# Patient Record
Sex: MALE | Race: WHITE | NOT HISPANIC OR LATINO | Employment: UNEMPLOYED | URBAN - NONMETROPOLITAN AREA
[De-identification: names, ages, dates, MRNs, and addresses within clinical notes are randomized per-mention and may not be internally consistent; named-entity substitution may affect disease eponyms.]

---

## 2020-12-15 NOTE — PROGRESS NOTES
Chief Complaint   Patient presents with   • Colon Cancer Screening     Pt presents today for colon recall-last colon was 10/12/2015; Personal history of hyperplastic polyps     Subjective   HPI  Issac Back is a 60 y.o. male who presents as a referral for preventative maintenance. He has no complaints of nausea or vomiting. No change in bowels. No wt loss. No BRBPR. No melena. There is no family hx for colon cancer. No abdominal pain. There was no Cologuard screening this year.  Patient's last colonoscopy revealed 1 6 mm hyperplastic polyp in the rectum performed on 10/12/2015.  Past Medical History:   Diagnosis Date   • History of colon polyps    • Hypertension      Past Surgical History:   Procedure Laterality Date   • COLONOSCOPY  10/12/2015    One 6mm hyperplastic polyp in the rectum; Repeat 5 years   • VASECTOMY         Current Outpatient Medications:   •  amLODIPine-benazepril (LOTREL 5-20) 5-20 MG per capsule, Take 1 capsule by mouth Daily., Disp: , Rfl:   •  sodium-potassium-magnesium sulfates (Suprep Bowel Prep Kit) 17.5-3.13-1.6 GM/177ML solution oral solution, Take 1 bottle by mouth Every 12 (Twelve) Hours. Split dose prep as directed by office instructions provided.  2 bottles = one kit., Disp: 2 bottle, Rfl: 0  No Known Allergies  Social History     Socioeconomic History   • Marital status:      Spouse name: Not on file   • Number of children: Not on file   • Years of education: Not on file   • Highest education level: Not on file   Tobacco Use   • Smoking status: Former Smoker   • Smokeless tobacco: Never Used   Substance and Sexual Activity   • Alcohol use: Not Currently   • Drug use: Never     Family History   Problem Relation Age of Onset   • Brain cancer Father    • Colon cancer Neg Hx    • Colon polyps Neg Hx    • Esophageal cancer Neg Hx    • Liver cancer Neg Hx    • Liver disease Neg Hx    • Rectal cancer Neg Hx    • Stomach cancer Neg Hx        REVIEW OF SYSTEMS  General: well  "appearing, no fever chills or sweats, no unexplained wt loss  HEENT: no acute visual or hearing disturbances  Cardiovascular: No chest pain or palpitations  Pulmonary: No shortness of breath, coughing, wheezing or hemoptysis  : No burning, urgency, hematuria, or dysuria  Musculoskeletal: No joint pain or stiffness  Peripheral: no edema  Skin: No lesions or rashes  Neuro: No dizziness, headaches, stroke, syncope  Endocrine: No hot or cold intolerances  Hematological: No blood dyscrasias    Objective   Vitals:    12/16/20 0825   BP: 134/82   BP Location: Left arm   Patient Position: Sitting   Cuff Size: Adult   Pulse: 84   Temp: 97.8 °F (36.6 °C)   TempSrc: Infrared   SpO2: 99%   Weight: 123 kg (271 lb)   Height: 182.9 cm (72\")     Body mass index is 36.75 kg/m².    PHYSICAL EXAM  General: age appropriate well nourished well appearing, no acute distress  Head: normocephalic and atraumatic  Global assessment-supple  Neck-No JVD noted, no lymphadenopathy  Pulmonary-clear to auscultation bilaterally, normal respiratory effort  Cardiovascular-normal rate and rhythm, normal heart sounds, S1 and S2 noted  Abdomen-soft, non tender, non distended, normal bowel sounds all 4 quadrants, no hepatosplenomegaly noted  Extremities-No clubbing cyanosis or edema  Neuro-Non focal, converses appropriately, awake, alert, oriented        Imaging Results (Most Recent)     None        Assessment/Plan   Diagnoses and all orders for this visit:    1. Hx of colonic polyps (Primary)  -     Case Request; Standing    Other orders  -     Follow Anesthesia Guidelines / Protocol; Future  -     Obtain Informed Consent; Future  -     Implement Anesthesia Orders Day of Procedure; Standing  -     Obtain Informed Consent; Standing  -     Verify bowel prep was successful; Standing  -     sodium-potassium-magnesium sulfates (Suprep Bowel Prep Kit) 17.5-3.13-1.6 GM/177ML solution oral solution; Take 1 bottle by mouth Every 12 (Twelve) Hours. Split dose " prep as directed by office instructions provided.  2 bottles = one kit.  Dispense: 2 bottle; Refill: 0      COLONOSCOPY WITH ANESTHESIA (N/A)       Body mass index is 36.75 kg/m². Patient's Body mass index is 36.75 kg/m². BMI is above normal parameters. Recommendations include: nutrition counseling.      All risks, benefits, alternatives, and indications of colonoscopy procedure have been discussed with the patient. Risks to include perforation of the colon requiring possible surgery or colostomy, risk of bleeding from biopsies or removal of colon tissue, possibility of missing a colon polyp or cancer, or adverse drug reaction.  Benefits to include the diagnosis and management of disease of the colon and rectum. Alternatives to include barium enema, radiographic evaluation, lab testing or no intervention. Pt verbalizes understanding and agrees.

## 2020-12-16 ENCOUNTER — OFFICE VISIT (OUTPATIENT)
Dept: GASTROENTEROLOGY | Facility: CLINIC | Age: 60
End: 2020-12-16

## 2020-12-16 VITALS
TEMPERATURE: 97.8 F | SYSTOLIC BLOOD PRESSURE: 134 MMHG | WEIGHT: 271 LBS | OXYGEN SATURATION: 99 % | DIASTOLIC BLOOD PRESSURE: 82 MMHG | BODY MASS INDEX: 36.7 KG/M2 | HEIGHT: 72 IN | HEART RATE: 84 BPM

## 2020-12-16 DIAGNOSIS — Z86.010 HX OF COLONIC POLYPS: Primary | ICD-10-CM

## 2020-12-16 PROBLEM — Z86.0100 HX OF COLONIC POLYPS: Status: ACTIVE | Noted: 2020-12-16

## 2020-12-16 PROCEDURE — S0285 CNSLT BEFORE SCREEN COLONOSC: HCPCS | Performed by: NURSE PRACTITIONER

## 2020-12-16 RX ORDER — AMLODIPINE BESYLATE AND BENAZEPRIL HYDROCHLORIDE 5; 20 MG/1; MG/1
1 CAPSULE ORAL DAILY
COMMUNITY
Start: 2020-11-17

## 2020-12-16 RX ORDER — SODIUM, POTASSIUM,MAG SULFATES 17.5-3.13G
1 SOLUTION, RECONSTITUTED, ORAL ORAL EVERY 12 HOURS
Qty: 2 BOTTLE | Refills: 0 | Status: ON HOLD | OUTPATIENT
Start: 2020-12-16 | End: 2021-01-27

## 2021-01-20 ENCOUNTER — TRANSCRIBE ORDERS (OUTPATIENT)
Dept: LAB | Facility: HOSPITAL | Age: 61
End: 2021-01-20

## 2021-01-20 DIAGNOSIS — Z01.818 PRE-OP TESTING: Primary | ICD-10-CM

## 2021-01-25 ENCOUNTER — LAB (OUTPATIENT)
Dept: LAB | Facility: HOSPITAL | Age: 61
End: 2021-01-25

## 2021-01-25 LAB — SARS-COV-2 ORF1AB RESP QL NAA+PROBE: NOT DETECTED

## 2021-01-25 PROCEDURE — U0004 COV-19 TEST NON-CDC HGH THRU: HCPCS | Performed by: INTERNAL MEDICINE

## 2021-01-25 PROCEDURE — C9803 HOPD COVID-19 SPEC COLLECT: HCPCS | Performed by: INTERNAL MEDICINE

## 2021-01-27 ENCOUNTER — ANESTHESIA (OUTPATIENT)
Dept: GASTROENTEROLOGY | Facility: HOSPITAL | Age: 61
End: 2021-01-27

## 2021-01-27 ENCOUNTER — TELEPHONE (OUTPATIENT)
Dept: GASTROENTEROLOGY | Facility: CLINIC | Age: 61
End: 2021-01-27

## 2021-01-27 ENCOUNTER — HOSPITAL ENCOUNTER (OUTPATIENT)
Facility: HOSPITAL | Age: 61
Setting detail: HOSPITAL OUTPATIENT SURGERY
Discharge: HOME | End: 2021-01-27
Attending: INTERNAL MEDICINE | Admitting: INTERNAL MEDICINE

## 2021-01-27 ENCOUNTER — ANESTHESIA EVENT (OUTPATIENT)
Dept: GASTROENTEROLOGY | Facility: HOSPITAL | Age: 61
End: 2021-01-27

## 2021-01-27 VITALS
BODY MASS INDEX: 35.76 KG/M2 | WEIGHT: 264 LBS | DIASTOLIC BLOOD PRESSURE: 83 MMHG | OXYGEN SATURATION: 98 % | RESPIRATION RATE: 18 BRPM | HEIGHT: 72 IN | SYSTOLIC BLOOD PRESSURE: 123 MMHG | HEART RATE: 71 BPM | TEMPERATURE: 98 F

## 2021-01-27 PROCEDURE — 25010000002 PROPOFOL 10 MG/ML EMULSION: Performed by: NURSE ANESTHETIST, CERTIFIED REGISTERED

## 2021-01-27 PROCEDURE — 45378 DIAGNOSTIC COLONOSCOPY: CPT | Performed by: INTERNAL MEDICINE

## 2021-01-27 RX ORDER — SODIUM CHLORIDE 0.9 % (FLUSH) 0.9 %
10 SYRINGE (ML) INJECTION AS NEEDED
Status: DISCONTINUED | OUTPATIENT
Start: 2021-01-27 | End: 2021-01-27 | Stop reason: HOSPADM

## 2021-01-27 RX ORDER — SODIUM CHLORIDE 9 MG/ML
500 INJECTION, SOLUTION INTRAVENOUS CONTINUOUS PRN
Status: DISCONTINUED | OUTPATIENT
Start: 2021-01-27 | End: 2021-01-27 | Stop reason: HOSPADM

## 2021-01-27 RX ORDER — LIDOCAINE HYDROCHLORIDE 20 MG/ML
INJECTION, SOLUTION EPIDURAL; INFILTRATION; INTRACAUDAL; PERINEURAL AS NEEDED
Status: DISCONTINUED | OUTPATIENT
Start: 2021-01-27 | End: 2021-01-27 | Stop reason: SURG

## 2021-01-27 RX ORDER — LIDOCAINE HYDROCHLORIDE 10 MG/ML
0.5 INJECTION, SOLUTION EPIDURAL; INFILTRATION; INTRACAUDAL; PERINEURAL ONCE AS NEEDED
Status: CANCELLED | OUTPATIENT
Start: 2021-01-27

## 2021-01-27 RX ORDER — PROPOFOL 10 MG/ML
VIAL (ML) INTRAVENOUS AS NEEDED
Status: DISCONTINUED | OUTPATIENT
Start: 2021-01-27 | End: 2021-01-27 | Stop reason: SURG

## 2021-01-27 RX ADMIN — PROPOFOL 300 MG: 10 INJECTION, EMULSION INTRAVENOUS at 13:02

## 2021-01-27 RX ADMIN — LIDOCAINE HYDROCHLORIDE 100 MG: 20 INJECTION, SOLUTION EPIDURAL; INFILTRATION; INTRACAUDAL; PERINEURAL at 13:02

## 2021-01-27 RX ADMIN — SODIUM CHLORIDE 500 ML: 9 INJECTION, SOLUTION INTRAVENOUS at 11:12

## 2021-01-27 NOTE — ANESTHESIA PREPROCEDURE EVALUATION
Anesthesia Evaluation     Patient summary reviewed   no history of anesthetic complications:  NPO Solid Status: > 8 hours             Airway   Mallampati: II  TM distance: >3 FB  Neck ROM: full  Dental      Pulmonary - negative pulmonary ROS   Cardiovascular   Exercise tolerance: excellent (>7 METS)    (+) hypertension,       Neuro/Psych- negative ROS  GI/Hepatic/Renal/Endo    (+) obesity,       Musculoskeletal     Abdominal    Substance History      OB/GYN          Other                        Anesthesia Plan    ASA 2     MAC       Anesthetic plan, all risks, benefits, and alternatives have been provided, discussed and informed consent has been obtained with: patient.

## 2021-01-27 NOTE — ANESTHESIA POSTPROCEDURE EVALUATION
Patient: Issac Back    Procedure Summary     Date: 01/27/21 Room / Location:  [facility] /  [facility]    Anesthesia Start: 1300 Anesthesia Stop: 1322    Procedure: COLONOSCOPY WITH ANESTHESIA (N/A ) Diagnosis:       Hx of colonic polyps      (Hx of colonic polyps [Z86.010])    Surgeon: Melinda Burns MD Provider: Naseem Covarrubias CRNA    Anesthesia Type: MAC ASA Status: 2          Anesthesia Type: MAC    Vitals  No vitals data found for the desired time range.          Post Anesthesia Care and Evaluation    Patient location during evaluation: PHASE II  Patient participation: complete - patient participated  Level of consciousness: awake  Pain management: adequate  Airway patency: patent  Anesthetic complications: No anesthetic complications  Respiratory status: acceptable  Hydration status: acceptable

## 2022-10-03 ENCOUNTER — APPOINTMENT (OUTPATIENT)
Dept: CT IMAGING | Age: 62
End: 2022-10-03
Payer: COMMERCIAL

## 2022-10-03 ENCOUNTER — HOSPITAL ENCOUNTER (EMERGENCY)
Age: 62
Discharge: ANOTHER HOSPITAL | End: 2022-10-04
Attending: EMERGENCY MEDICINE
Payer: COMMERCIAL

## 2022-10-03 DIAGNOSIS — R74.8 ELEVATED LIVER ENZYMES: ICD-10-CM

## 2022-10-03 DIAGNOSIS — R17 ELEVATED BILIRUBIN: ICD-10-CM

## 2022-10-03 DIAGNOSIS — R10.11 ABDOMINAL PAIN, RIGHT UPPER QUADRANT: Primary | ICD-10-CM

## 2022-10-03 DIAGNOSIS — K80.20 GALLSTONES: ICD-10-CM

## 2022-10-03 LAB
ALBUMIN SERPL-MCNC: 4.6 G/DL (ref 3.5–5.2)
ALP BLD-CCNC: 95 U/L (ref 40–130)
ALT SERPL-CCNC: 60 U/L (ref 5–41)
ANION GAP SERPL CALCULATED.3IONS-SCNC: 14 MMOL/L (ref 7–19)
AST SERPL-CCNC: 42 U/L (ref 5–40)
BACTERIA: NEGATIVE /HPF
BASOPHILS ABSOLUTE: 0.1 K/UL (ref 0–0.2)
BASOPHILS RELATIVE PERCENT: 0.3 % (ref 0–1)
BILIRUB SERPL-MCNC: 2.1 MG/DL (ref 0.2–1.2)
BILIRUBIN URINE: ABNORMAL
BLOOD, URINE: NEGATIVE
BUN BLDV-MCNC: 20 MG/DL (ref 8–23)
CALCIUM SERPL-MCNC: 9.5 MG/DL (ref 8.8–10.2)
CHLORIDE BLD-SCNC: 102 MMOL/L (ref 98–111)
CLARITY: ABNORMAL
CO2: 26 MMOL/L (ref 22–29)
COLOR: ABNORMAL
CREAT SERPL-MCNC: 0.9 MG/DL (ref 0.5–1.2)
CRYSTALS, UA: ABNORMAL /HPF
EOSINOPHILS ABSOLUTE: 0.2 K/UL (ref 0–0.6)
EOSINOPHILS RELATIVE PERCENT: 0.9 % (ref 0–5)
EPITHELIAL CELLS, UA: 2 /HPF (ref 0–5)
GFR AFRICAN AMERICAN: >59
GFR NON-AFRICAN AMERICAN: >60
GLUCOSE BLD-MCNC: 140 MG/DL (ref 74–109)
GLUCOSE URINE: NEGATIVE MG/DL
HCT VFR BLD CALC: 51.1 % (ref 42–52)
HEMOGLOBIN: 16.9 G/DL (ref 14–18)
HYALINE CASTS: 20 /HPF (ref 0–8)
IMMATURE GRANULOCYTES #: 0.1 K/UL
KETONES, URINE: ABNORMAL MG/DL
LEUKOCYTE ESTERASE, URINE: NEGATIVE
LIPASE: 22 U/L (ref 13–60)
LYMPHOCYTES ABSOLUTE: 1.6 K/UL (ref 1.1–4.5)
LYMPHOCYTES RELATIVE PERCENT: 8.5 % (ref 20–40)
MCH RBC QN AUTO: 28 PG (ref 27–31)
MCHC RBC AUTO-ENTMCNC: 33.1 G/DL (ref 33–37)
MCV RBC AUTO: 84.6 FL (ref 80–94)
MONOCYTES ABSOLUTE: 0.9 K/UL (ref 0–0.9)
MONOCYTES RELATIVE PERCENT: 4.8 % (ref 0–10)
NEUTROPHILS ABSOLUTE: 15.7 K/UL (ref 1.5–7.5)
NEUTROPHILS RELATIVE PERCENT: 85 % (ref 50–65)
NITRITE, URINE: NEGATIVE
PDW BLD-RTO: 12.5 % (ref 11.5–14.5)
PH UA: 5.5 (ref 5–8)
PLATELET # BLD: 263 K/UL (ref 130–400)
PMV BLD AUTO: 10.6 FL (ref 9.4–12.4)
POTASSIUM SERPL-SCNC: 3.9 MMOL/L (ref 3.5–5)
PROTEIN UA: 30 MG/DL
RBC # BLD: 6.04 M/UL (ref 4.7–6.1)
RBC UA: 1 /HPF (ref 0–4)
SARS-COV-2, NAAT: NOT DETECTED
SODIUM BLD-SCNC: 142 MMOL/L (ref 136–145)
SPECIFIC GRAVITY UA: 1.03 (ref 1–1.03)
TOTAL PROTEIN: 8 G/DL (ref 6.6–8.7)
UROBILINOGEN, URINE: 1 E.U./DL
WBC # BLD: 18.4 K/UL (ref 4.8–10.8)
WBC UA: 3 /HPF (ref 0–5)

## 2022-10-03 PROCEDURE — 36415 COLL VENOUS BLD VENIPUNCTURE: CPT

## 2022-10-03 PROCEDURE — 81001 URINALYSIS AUTO W/SCOPE: CPT

## 2022-10-03 PROCEDURE — 96365 THER/PROPH/DIAG IV INF INIT: CPT

## 2022-10-03 PROCEDURE — 87635 SARS-COV-2 COVID-19 AMP PRB: CPT

## 2022-10-03 PROCEDURE — 83690 ASSAY OF LIPASE: CPT

## 2022-10-03 PROCEDURE — 74177 CT ABD & PELVIS W/CONTRAST: CPT

## 2022-10-03 PROCEDURE — 99285 EMERGENCY DEPT VISIT HI MDM: CPT

## 2022-10-03 PROCEDURE — 80053 COMPREHEN METABOLIC PANEL: CPT

## 2022-10-03 PROCEDURE — 85025 COMPLETE CBC W/AUTO DIFF WBC: CPT

## 2022-10-03 PROCEDURE — 2580000003 HC RX 258: Performed by: EMERGENCY MEDICINE

## 2022-10-03 PROCEDURE — 6360000004 HC RX CONTRAST MEDICATION: Performed by: EMERGENCY MEDICINE

## 2022-10-03 RX ORDER — SODIUM CHLORIDE 9 MG/ML
INJECTION, SOLUTION INTRAVENOUS CONTINUOUS
Status: DISCONTINUED | OUTPATIENT
Start: 2022-10-03 | End: 2022-10-04 | Stop reason: HOSPADM

## 2022-10-03 RX ADMIN — IOPAMIDOL 70 ML: 755 INJECTION, SOLUTION INTRAVENOUS at 22:56

## 2022-10-03 RX ADMIN — SODIUM CHLORIDE: 9 INJECTION, SOLUTION INTRAVENOUS at 22:29

## 2022-10-03 ASSESSMENT — PAIN DESCRIPTION - ORIENTATION: ORIENTATION: RIGHT;UPPER

## 2022-10-03 ASSESSMENT — ENCOUNTER SYMPTOMS
SHORTNESS OF BREATH: 0
EYE PAIN: 0
ABDOMINAL PAIN: 1
DIARRHEA: 0
BLOOD IN STOOL: 0
VOMITING: 1
NAUSEA: 1

## 2022-10-03 ASSESSMENT — PAIN DESCRIPTION - LOCATION: LOCATION: ABDOMEN

## 2022-10-03 ASSESSMENT — PAIN - FUNCTIONAL ASSESSMENT: PAIN_FUNCTIONAL_ASSESSMENT: 0-10

## 2022-10-03 ASSESSMENT — PAIN SCALES - GENERAL: PAINLEVEL_OUTOF10: 4

## 2022-10-04 VITALS
TEMPERATURE: 97.9 F | HEART RATE: 66 BPM | BODY MASS INDEX: 35.89 KG/M2 | OXYGEN SATURATION: 96 % | DIASTOLIC BLOOD PRESSURE: 72 MMHG | HEIGHT: 72 IN | SYSTOLIC BLOOD PRESSURE: 131 MMHG | WEIGHT: 265 LBS | RESPIRATION RATE: 16 BRPM

## 2022-10-04 PROCEDURE — 2580000003 HC RX 258: Performed by: EMERGENCY MEDICINE

## 2022-10-04 PROCEDURE — 6360000002 HC RX W HCPCS: Performed by: EMERGENCY MEDICINE

## 2022-10-04 RX ADMIN — PIPERACILLIN AND TAZOBACTAM 4500 MG: 4; .5 INJECTION, POWDER, FOR SOLUTION INTRAVENOUS at 00:47

## 2022-10-04 NOTE — ED PROVIDER NOTES
[facility] EMERGENCY DEPT  eMERGENCY dEPARTMENT eNCOUnter      Pt Name: Giana Salmon  MRN: [id number]  [place] 1960  Date of evaluation: 10/3/2022  Provider: Luis Landaverde MD    CHIEF COMPLAINT       Chief Complaint   Patient presents with    Abdominal Pain     RUQ pain          HISTORY OF PRESENT ILLNESS   (Location/Symptom, Timing/Onset,Context/Setting, Quality, Duration, Modifying Factors, Severity)  Note limiting factors. Giana Salmon is a 57 y.o. male who presents to the emergency department. Patient said he has had intermittent abdominal pain for the past year. Pain is all been in the right upper quadrant. But a year ago had extensive work-up including ultrasound and HIDA scan that were normal.  Has been doing okay until about a week ago had severe right upper quadrant pain that resolved on its own. No issues since then until today pain became severe again. No fevers. Has had nausea and vomiting. No hematemesis. No bloody stools. No diarrhea. Pain localized to the right upper quadrant without exacerbating relieving factors. No urinary complaints. HPI    NursingNotes were reviewed. REVIEW OF SYSTEMS    (2-9 systems for level 4, 10 or more for level 5)     Review of Systems   Constitutional:  Negative for fever. Eyes:  Negative for pain. Respiratory:  Negative for shortness of breath. Cardiovascular:  Negative for chest pain and palpitations. Gastrointestinal:  Positive for abdominal pain, nausea and vomiting. Negative for blood in stool and diarrhea. Genitourinary:  Negative for difficulty urinating and dysuria. Skin:  Negative for rash. Neurological:  Negative for weakness and headaches. All other systems reviewed and are negative. A complete review of systems was performed and is negative except as noted above in the HPI. PAST MEDICAL HISTORY     Past Medical History:   Diagnosis Date    Hypertension          SURGICAL HISTORY     History reviewed.  No pertinent surgical history. CURRENT MEDICATIONS       Previous Medications    AMLODIPINE-BENAZEPRIL (LOTREL) 5-20 MG PER CAPSULE           ALLERGIES     Patient has no known allergies. FAMILY HISTORY     History reviewed. No pertinent family history. SOCIAL HISTORY       Social History     Socioeconomic History    Marital status:      Spouse name: None    Number of children: None    Years of education: None    Highest education level: None   Tobacco Use    Smoking status: Never    Smokeless tobacco: Never   Substance and Sexual Activity    Alcohol use: No    Drug use: No       SCREENINGS    Rigo Coma Scale  Eye Opening: Spontaneous  Best Verbal Response: Oriented  Best Motor Response: Obeys commands  Rigo Coma Scale Score: 15        PHYSICAL EXAM    (up to 7 for level 4, 8 or more for level 5)     ED Triage Vitals   BP Temp Temp src Heart Rate Resp SpO2 Height Weight   10/03/22 1736 10/03/22 1735 -- 10/03/22 1735 10/03/22 1735 10/03/22 1735 10/03/22 1735 10/03/22 1735   130/76 99 °F (37.2 °C)  84 16 98 % 6' (1.829 m) 265 lb (120.2 kg)       Physical Exam  Vitals reviewed. Constitutional:       General: He is not in acute distress. Appearance: He is well-developed. HENT:      Head: Normocephalic and atraumatic. Eyes:      General: No scleral icterus. Pupils: Pupils are equal, round, and reactive to light. Neck:      Vascular: No JVD. Cardiovascular:      Rate and Rhythm: Normal rate and regular rhythm. Heart sounds: Normal heart sounds. Pulmonary:      Effort: Pulmonary effort is normal. No respiratory distress. Breath sounds: Normal breath sounds. Abdominal:      General: There is no distension. Palpations: Abdomen is soft. There is no pulsatile mass. Tenderness: There is abdominal tenderness in the right upper quadrant. There is no guarding or rebound. Musculoskeletal:         General: No tenderness.       Cervical back: Normal range of motion and neck supple. Right lower leg: No edema. Left lower leg: No edema. Skin:     General: Skin is warm and dry. Capillary Refill: Capillary refill takes less than 2 seconds. Neurological:      Mental Status: He is alert and oriented to person, place, and time.    Psychiatric:         Behavior: Behavior normal.       DIAGNOSTIC RESULTS     EKG: All EKG's are interpreted by the Emergency Department Physician who either signs or Co-signs this chart in the absence of a cardiologist.        RADIOLOGY:   Non-plain film images such as CT, Ultrasound and MRI are read by the radiologist. Mark Deana images are visualized and preliminarily interpreted by the emergency physician with the below findings:        Interpretation per the Radiologist below, if available at the time of this note:    CT ABDOMEN PELVIS W IV CONTRAST Additional Contrast? None   Final Result            ED BEDSIDE ULTRASOUND:   Performed by ED Physician - none    LABS:  Labs Reviewed   CBC WITH AUTO DIFFERENTIAL - Abnormal; Notable for the following components:       Result Value    WBC 18.4 (*)     Neutrophils % 85.0 (*)     Lymphocytes % 8.5 (*)     Neutrophils Absolute 15.7 (*)     All other components within normal limits   COMPREHENSIVE METABOLIC PANEL - Abnormal; Notable for the following components:    Glucose 140 (*)     Total Bilirubin 2.1 (*)     ALT 60 (*)     AST 42 (*)     All other components within normal limits   URINALYSIS WITH REFLEX TO CULTURE - Abnormal; Notable for the following components:    Color, UA DARK YELLOW (*)     Clarity, UA TURBID (*)     Bilirubin Urine SMALL (*)     Ketones, Urine TRACE (*)     Protein, UA 30 (*)     All other components within normal limits   MICROSCOPIC URINALYSIS - Abnormal; Notable for the following components:    Bacteria, UA NEGATIVE (*)     Crystals, UA NEG (*)     Hyaline Casts, UA 20 (*)     All other components within normal limits   COVID-19, RAPID   LIPASE       All other labs were within normal range or not returned as of this dictation. EMERGENCY DEPARTMENT COURSE and DIFFERENTIALDIAGNOSIS/MDM:   Vitals:    Vitals:    10/03/22 1735 10/03/22 1736 10/03/22 2229 10/04/22 0006   BP:  130/76 132/70 135/79   Pulse: 84  73 65   Resp: 16  16 16   Temp: 99 °F (37.2 °C)  98.1 °F (36.7 °C)    TempSrc:   Infrared    SpO2: 98%  95% 96%   Weight: 265 lb (120.2 kg)      Height: 6' (1.829 m)          MDM    Gallstone seen on CT. Bilirubin and liver enzymes up a little bit. Also has a white count. Patient's case discussed with Dr. Chi You, on-call general surgeon, who is agreeable plan of care and will see in consult but wants patient admitted to medicine service with plan for GI consult for possible ERCP. Patient's case discussed with Dr. Olvin Mims, who said we have no ERCP available for the next week so patient will need to be transferred elsewhere. Patient's case discussed with Dr. Cari Todd, ER attending at [facility]. He said they have general surgery and GI with ERCP capability available. Dr. Cari Todd accepts patient in transfer to [facility] ER. Patient resting comfortably and updated about plan. CONSULTS:  IP CONSULT TO GENERAL SURGERY  IP CONSULT TO GI    PROCEDURES:  Unless otherwise notedbelow, none     Procedures    FINAL IMPRESSION     1. Abdominal pain, right upper quadrant    2. Gallstones    3. Elevated bilirubin    4.  Elevated liver enzymes          DISPOSITION/PLAN   DISPOSITION Decision To Transfer 10/04/2022 01:03:55 AM      PATIENT REFERRED TO:  @FUP@    DISCHARGE MEDICATIONS:  New Prescriptions    No medications on file          (Please note that portions of this note were completed with a voice recognition program.  Efforts were made to edit the dictations butoccasionally words are mis-transcribed.)    Suzie Herrera MD (electronically signed)  AttendingEmergency Physician          Suzie Herrera MD  10/04/22 2571

## 2022-10-04 NOTE — ED NOTES
Called [facility] at [phone] for Dr. Trang Franz. [address] [facility] accepted patient. Accepting doctor is Dr. Eder Palacios.   Call report to the ED     Tony Garcia  10/04/22 0117

## 2024-02-27 ENCOUNTER — APPOINTMENT (OUTPATIENT)
Dept: GENERAL RADIOLOGY | Facility: HOSPITAL | Age: 64
End: 2024-02-27
Payer: MEDICARE

## 2024-02-27 ENCOUNTER — HOSPITAL ENCOUNTER (EMERGENCY)
Facility: HOSPITAL | Age: 64
Discharge: HOME | End: 2024-02-27
Admitting: STUDENT IN AN ORGANIZED HEALTH CARE EDUCATION/TRAINING PROGRAM
Payer: MEDICARE

## 2024-02-27 VITALS
TEMPERATURE: 97.5 F | BODY MASS INDEX: 36.57 KG/M2 | OXYGEN SATURATION: 98 % | WEIGHT: 270 LBS | HEART RATE: 74 BPM | HEIGHT: 72 IN | SYSTOLIC BLOOD PRESSURE: 172 MMHG | DIASTOLIC BLOOD PRESSURE: 84 MMHG | RESPIRATION RATE: 20 BRPM
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
## 2024-02-27 DIAGNOSIS — T14.8XXA PUNCTURE WOUND: ICD-10-CM

## 2024-02-27 PROCEDURE — 25010000002 TETANUS-DIPHTH-ACELL PERTUSSIS 5-2.5-18.5 LF-MCG/0.5 SUSPENSION PREFILLED SYRINGE: Performed by: NURSE PRACTITIONER

## 2024-02-27 PROCEDURE — 73130 X-RAY EXAM OF HAND: CPT

## 2024-02-27 PROCEDURE — 73110 X-RAY EXAM OF WRIST: CPT

## 2024-02-27 PROCEDURE — 90471 IMMUNIZATION ADMIN: CPT | Performed by: NURSE PRACTITIONER

## 2024-02-27 PROCEDURE — 99283 EMERGENCY DEPT VISIT LOW MDM: CPT

## 2024-02-27 PROCEDURE — 90715 TDAP VACCINE 7 YRS/> IM: CPT | Performed by: NURSE PRACTITIONER

## 2024-02-27 RX ORDER — HYDROCODONE BITARTRATE AND ACETAMINOPHEN 5; 325 MG/1; MG/1
1 TABLET ORAL EVERY 6 HOURS PRN
Qty: 15 TABLET | Refills: 0 | Status: SHIPPED | OUTPATIENT
Start: 2024-02-27

## 2024-02-27 RX ORDER — AMOXICILLIN AND CLAVULANATE POTASSIUM 875; 125 MG/1; MG/1
1 TABLET, FILM COATED ORAL 2 TIMES DAILY
Qty: 20 TABLET | Refills: 0 | Status: SHIPPED | OUTPATIENT
Start: 2024-02-27 | End: 2024-03-08

## 2024-02-27 RX ORDER — AMOXICILLIN AND CLAVULANATE POTASSIUM 875; 125 MG/1; MG/1
1 TABLET, FILM COATED ORAL ONCE
Status: COMPLETED | OUTPATIENT
Start: 2024-02-27 | End: 2024-02-27

## 2024-02-27 RX ADMIN — TETANUS TOXOID, REDUCED DIPHTHERIA TOXOID AND ACELLULAR PERTUSSIS VACCINE, ADSORBED 0.5 ML: 5; 2.5; 8; 8; 2.5 SUSPENSION INTRAMUSCULAR at 19:12

## 2024-02-27 RX ADMIN — AMOXICILLIN AND CLAVULANATE POTASSIUM 1 TABLET: 875; 125 TABLET, FILM COATED ORAL at 19:11

## 2024-02-28 NOTE — DISCHARGE INSTRUCTIONS
Return to ER if symptoms worsen   Clean wound twice daily with soap and water and apply bacitracin, Adaptic, and Kassandra  Elevate and ice  Recheck wound in 2 days either with your primary care provider or in the emergency department.  Return the emergency department before if has increased swelling, redness, drainage from the wound.

## 2024-02-28 NOTE — ED PROVIDER NOTES
Subjective   History of Present Illness  Patient is a 63-year-old male presents the emergency department with dog bite to the right upper extremity.  He states he was  the neighbors dogs from fighting and sustained a dog bite to the right wrist and hand.  This occurred just prior to arrival.  He is unsure of his last tetanus immunization.  Patient is left-hand dominant.  He denies any other injury.    History provided by:  Patient   used: No        Review of Systems   Constitutional: Negative.    HENT: Negative.     Eyes: Negative.    Respiratory: Negative.     Cardiovascular: Negative.    Gastrointestinal: Negative.    Endocrine: Negative.    Genitourinary: Negative.    Musculoskeletal:         Patient is a 63-year-old male presents the emergency department with dog bite to the right upper extremity.  He states he was  the neighbors dogs from fighting and sustained a dog bite to the right wrist and hand.  This occurred just prior to arrival.  He is unsure of his last tetanus immunization.  Patient is left-hand dominant.  He denies any other injury.     Skin: Negative.    Allergic/Immunologic: Negative.    Neurological: Negative.    Hematological: Negative.    Psychiatric/Behavioral: Negative.     All other systems reviewed and are negative.      Past Medical History:   Diagnosis Date    History of colon polyps     Hypertension        No Known Allergies    Past Surgical History:   Procedure Laterality Date    COLONOSCOPY  10/12/2015    One 6mm hyperplastic polyp in the rectum; Repeat 5 years    COLONOSCOPY N/A 1/27/2021    Procedure: COLONOSCOPY WITH ANESTHESIA;  Surgeon: Melinda Burns MD;  Location: [facility] ENDOSCOPY;  Service: Gastroenterology;  Laterality: N/A;  pre op: hx polyps  post op: int hemmhroids   PCP: Anupam Quintana MD    VASECTOMY         Family History   Problem Relation Age of Onset    Brain cancer Father     No Known Problems Mother     Colon cancer Neg Hx  "    Colon polyps Neg Hx     Esophageal cancer Neg Hx     Liver cancer Neg Hx     Liver disease Neg Hx     Rectal cancer Neg Hx     Stomach cancer Neg Hx        Social History     Socioeconomic History    Marital status:    Tobacco Use    Smoking status: Former     Types: Cigarettes    Smokeless tobacco: Never   Vaping Use    Vaping Use: Never used   Substance and Sexual Activity    Alcohol use: Not Currently    Drug use: Never    Sexual activity: Defer       Prior to Admission medications    Medication Sig Start Date End Date Taking? Authorizing Provider   amLODIPine-benazepril (LOTREL 5-20) 5-20 MG per capsule Take 1 capsule by mouth Daily. 11/17/20   Provider, MD Comfort       /84 (BP Location: Left arm, Patient Position: Sitting)   Pulse 74   Temp 97.5 °F (36.4 °C) (Oral)   Resp 20   Ht 182.9 cm (72\")   Wt 122 kg (270 lb)   SpO2 98%   BMI 36.62 kg/m²     Objective   Physical Exam  Vitals and nursing note reviewed.   Constitutional:       Appearance: He is well-developed.   HENT:      Head: Normocephalic and atraumatic.   Eyes:      Conjunctiva/sclera: Conjunctivae normal.      Pupils: Pupils are equal, round, and reactive to light.   Cardiovascular:      Rate and Rhythm: Normal rate and regular rhythm.      Heart sounds: Normal heart sounds.   Pulmonary:      Effort: Pulmonary effort is normal.      Breath sounds: Normal breath sounds.   Abdominal:      Palpations: Abdomen is soft.   Musculoskeletal:      Cervical back: Normal range of motion and neck supple.      Comments: Right upper extremity: There are 3 puncture wounds noted to the dorsal aspect of the right thumb and base of the distal wrist.  Flexion extension intact to all digits.  Peripheral pulses are palpable.  No active bleeding noted.  Tenderness on palpation of the base of the right thumb on the volar aspect.  Mild soft tissue swelling noted.  No snuffbox tenderness noted.   Skin:     General: Skin is warm and dry. "   Neurological:      Mental Status: He is alert and oriented to person, place, and time.      Deep Tendon Reflexes: Reflexes are normal and symmetric.   Psychiatric:         Behavior: Behavior normal.         Thought Content: Thought content normal.         Judgment: Judgment normal.         Procedures         Lab Results (last 24 hours)       ** No results found for the last 24 hours. **            XR Hand 3+ View Right   Final Result       1. Degenerative changes and no acute osseous abnormality.       This report was signed and finalized on 2/27/2024 7:01 PM by Jadiel Vigil.          XR Wrist 3+ View Right   Final Result   1. No acute abnormality.       This report was signed and finalized on 2/27/2024 7:21 PM by Jadiel Vigil.              ED Course  ED Course as of 02/27/24 1935 Tue Feb 27, 2024 1924 X-ray of the right hand is negative for any acute fracture.  X-ray of the right wrist is negative for any acute fracture or abnormality.  Wounds do not need repair with sutures.  Patient has puncture wounds.  He is up-to-date on his tetanus.  He was given Augmentin while in the emergency department.  Advised the patient to clean the wound twice daily with soap and water and apply bacitracin, Adaptic, and Kassandra.  Advised that the wound needs to be rechecked in 2 days.  Return the emergency department before if has increased swelling, redness, drainage from the wound.  Reviewed the care plan with the patient.  He is in agreement with the care plan and voices understanding of instructions.  He will be written a small amount of pain medication for acute pain.  Reviewed side effects and potential for abuse.  Onesimo report completed no signs of suspicious activity noted.  Patient be discharged shortly in stable condition. [CW]      ED Course User Index  [CW] Jie Sutherland APRN        Medical Decision Making  Patient is a 63-year-old male presents the emergency department with dog bite to the right  upper extremity.  He states he was  the neighbors dogs from fighting and sustained a dog bite to the right wrist and hand.  This occurred just prior to arrival.  He is unsure of his last tetanus immunization.  Patient is left-hand dominant.  He denies any other injury.  Course of treatment in the ED: Patient updated on his tetanus.  X-ray of the right hand and wrist is ordered.  Wound will be cleansed and dressX-ray of the right hand is negative for any acute fracture.  X-ray of the right wrist is negative for any acute fracture or abnormality.  Wounds do not need repair with sutures.  Patient has puncture wounds.  He is up-to-date on his tetanus.  He was given Augmentin while in the emergency department.  Advised the patient to clean the wound twice daily with soap and water and apply bacitracin, Adaptic, and Kassandra.  Advised that the wound needs to be rechecked in 2 days.  Return the emergency department before if has increased swelling, redness, drainage from the wound.  Reviewed the care plan with the patient.  He is in agreement with the care plan and voices understanding of instructions.  He will be written a small amount of pain medication for acute pain.  Reviewed side effects and potential for abuse.  Onesimo report completed no signs of suspicious activity noted.  Patient be discharged shortly in stable condition.  ed.  None of the wounds knee laceration repair.  Ice applied to the wounds.  Augmentin ordered while in the ER.  XR Hand 3+ View Right   Final Result         1. Degenerative changes and no acute osseous abnormality.         This report was signed and finalized on 2/27/2024 7:01 PM by Jadiel Vigil.          XR Wrist 3+ View Right   Final Result    1. No acute abnormality.         This report was signed and finalized on 2/27/2024 7:21 PM by Jadiel Vigil.              Problems Addressed:  Dog bite, initial encounter: complicated acute illness or injury  Puncture wound:  complicated acute illness or injury    Amount and/or Complexity of Data Reviewed  Radiology: ordered. Decision-making details documented in ED Course.    Risk  Prescription drug management.         Final diagnoses:   Dog bite, initial encounter   Puncture wound          Jie Sutherland, APRN  02/27/24 193

## 2024-06-21 PROCEDURE — 99285 EMERGENCY DEPT VISIT HI MDM: CPT

## 2024-06-22 ENCOUNTER — HOSPITAL ENCOUNTER (EMERGENCY)
Facility: HOSPITAL | Age: 64
Discharge: HOME | End: 2024-06-22
Attending: EMERGENCY MEDICINE
Payer: MEDICARE

## 2024-06-22 ENCOUNTER — APPOINTMENT (OUTPATIENT)
Dept: CT IMAGING | Facility: HOSPITAL | Age: 64
End: 2024-06-22
Payer: MEDICARE

## 2024-06-22 VITALS
DIASTOLIC BLOOD PRESSURE: 80 MMHG | HEART RATE: 70 BPM | SYSTOLIC BLOOD PRESSURE: 149 MMHG | OXYGEN SATURATION: 90 % | HEIGHT: 71 IN | TEMPERATURE: 98.4 F | BODY MASS INDEX: 36.68 KG/M2 | WEIGHT: 262 LBS | RESPIRATION RATE: 20 BRPM

## 2024-06-22 DIAGNOSIS — N20.0 KIDNEY STONE: Primary | ICD-10-CM

## 2024-06-22 DIAGNOSIS — R22.2 MASS OF CHEST WALL: ICD-10-CM

## 2024-06-22 LAB
ALBUMIN SERPL-MCNC: 4.5 G/DL (ref 3.5–5.2)
ALBUMIN/GLOB SERPL: 1.3 G/DL
ALP SERPL-CCNC: 172 U/L (ref 39–117)
ALT SERPL W P-5'-P-CCNC: 69 U/L (ref 1–41)
ANION GAP SERPL CALCULATED.3IONS-SCNC: 14 MMOL/L (ref 5–15)
AST SERPL-CCNC: 38 U/L (ref 1–40)
BACTERIA UR QL AUTO: ABNORMAL /HPF
BASOPHILS # BLD AUTO: 0.03 10*3/MM3 (ref 0–0.2)
BASOPHILS NFR BLD AUTO: 0.2 % (ref 0–1.5)
BILIRUB SERPL-MCNC: 2.6 MG/DL (ref 0–1.2)
BILIRUB UR QL STRIP: NEGATIVE
BUN SERPL-MCNC: 24 MG/DL (ref 8–23)
BUN/CREAT SERPL: 19.5 (ref 7–25)
CALCIUM SPEC-SCNC: 9.5 MG/DL (ref 8.6–10.5)
CHLORIDE SERPL-SCNC: 101 MMOL/L (ref 98–107)
CLARITY UR: ABNORMAL
CO2 SERPL-SCNC: 24 MMOL/L (ref 22–29)
COLOR UR: YELLOW
CREAT SERPL-MCNC: 1.23 MG/DL (ref 0.76–1.27)
DEPRECATED RDW RBC AUTO: 36.3 FL (ref 37–54)
EGFRCR SERPLBLD CKD-EPI 2021: 64.7 ML/MIN/1.73
EOSINOPHIL # BLD AUTO: 0.01 10*3/MM3 (ref 0–0.4)
EOSINOPHIL NFR BLD AUTO: 0.1 % (ref 0.3–6.2)
ERYTHROCYTE [DISTWIDTH] IN BLOOD BY AUTOMATED COUNT: 12.3 % (ref 12.3–15.4)
GLOBULIN UR ELPH-MCNC: 3.5 GM/DL
GLUCOSE SERPL-MCNC: 175 MG/DL (ref 65–99)
GLUCOSE UR STRIP-MCNC: ABNORMAL MG/DL
HCT VFR BLD AUTO: 44.1 % (ref 37.5–51)
HGB BLD-MCNC: 15.4 G/DL (ref 13–17.7)
HGB UR QL STRIP.AUTO: ABNORMAL
HYALINE CASTS UR QL AUTO: ABNORMAL /LPF
IMM GRANULOCYTES # BLD AUTO: 0.07 10*3/MM3 (ref 0–0.05)
IMM GRANULOCYTES NFR BLD AUTO: 0.4 % (ref 0–0.5)
KETONES UR QL STRIP: ABNORMAL
LEUKOCYTE ESTERASE UR QL STRIP.AUTO: NEGATIVE
LIPASE SERPL-CCNC: 31 U/L (ref 13–60)
LYMPHOCYTES # BLD AUTO: 0.98 10*3/MM3 (ref 0.7–3.1)
LYMPHOCYTES NFR BLD AUTO: 6.1 % (ref 19.6–45.3)
MCH RBC QN AUTO: 28.5 PG (ref 26.6–33)
MCHC RBC AUTO-ENTMCNC: 34.9 G/DL (ref 31.5–35.7)
MCV RBC AUTO: 81.5 FL (ref 79–97)
MONOCYTES # BLD AUTO: 0.53 10*3/MM3 (ref 0.1–0.9)
MONOCYTES NFR BLD AUTO: 3.3 % (ref 5–12)
NEUTROPHILS NFR BLD AUTO: 14.36 10*3/MM3 (ref 1.7–7)
NEUTROPHILS NFR BLD AUTO: 89.9 % (ref 42.7–76)
NITRITE UR QL STRIP: NEGATIVE
NRBC BLD AUTO-RTO: 0 /100 WBC (ref 0–0.2)
PH UR STRIP.AUTO: 7.5 [PH] (ref 5–8)
PLATELET # BLD AUTO: 265 10*3/MM3 (ref 140–450)
PMV BLD AUTO: 10.9 FL (ref 6–12)
POTASSIUM SERPL-SCNC: 3.7 MMOL/L (ref 3.5–5.2)
PROT SERPL-MCNC: 8 G/DL (ref 6–8.5)
PROT UR QL STRIP: ABNORMAL
RBC # BLD AUTO: 5.41 10*6/MM3 (ref 4.14–5.8)
RBC # UR STRIP: ABNORMAL /HPF
REF LAB TEST METHOD: ABNORMAL
SODIUM SERPL-SCNC: 139 MMOL/L (ref 136–145)
SP GR UR STRIP: 1.02 (ref 1–1.03)
SQUAMOUS #/AREA URNS HPF: ABNORMAL /HPF
UROBILINOGEN UR QL STRIP: ABNORMAL
WBC # UR STRIP: ABNORMAL /HPF
WBC NRBC COR # BLD AUTO: 15.98 10*3/MM3 (ref 3.4–10.8)

## 2024-06-22 PROCEDURE — 81001 URINALYSIS AUTO W/SCOPE: CPT | Performed by: EMERGENCY MEDICINE

## 2024-06-22 PROCEDURE — 25510000001 IOPAMIDOL 61 % SOLUTION: Performed by: EMERGENCY MEDICINE

## 2024-06-22 PROCEDURE — 83690 ASSAY OF LIPASE: CPT | Performed by: EMERGENCY MEDICINE

## 2024-06-22 PROCEDURE — 85025 COMPLETE CBC W/AUTO DIFF WBC: CPT | Performed by: EMERGENCY MEDICINE

## 2024-06-22 PROCEDURE — 74177 CT ABD & PELVIS W/CONTRAST: CPT

## 2024-06-22 PROCEDURE — 96374 THER/PROPH/DIAG INJ IV PUSH: CPT

## 2024-06-22 PROCEDURE — 25010000002 ONDANSETRON PER 1 MG: Performed by: EMERGENCY MEDICINE

## 2024-06-22 PROCEDURE — 80053 COMPREHEN METABOLIC PANEL: CPT | Performed by: EMERGENCY MEDICINE

## 2024-06-22 PROCEDURE — 96375 TX/PRO/DX INJ NEW DRUG ADDON: CPT

## 2024-06-22 PROCEDURE — 25010000002 KETOROLAC TROMETHAMINE PER 15 MG: Performed by: EMERGENCY MEDICINE

## 2024-06-22 PROCEDURE — 25010000002 MORPHINE PER 10 MG: Performed by: EMERGENCY MEDICINE

## 2024-06-22 PROCEDURE — 25810000003 SODIUM CHLORIDE 0.9 % SOLUTION: Performed by: EMERGENCY MEDICINE

## 2024-06-22 RX ORDER — TAMSULOSIN HYDROCHLORIDE 0.4 MG/1
1 CAPSULE ORAL NIGHTLY
Qty: 7 CAPSULE | Refills: 0 | Status: SHIPPED | OUTPATIENT
Start: 2024-06-22

## 2024-06-22 RX ORDER — KETOROLAC TROMETHAMINE 30 MG/ML
30 INJECTION, SOLUTION INTRAMUSCULAR; INTRAVENOUS ONCE
Status: COMPLETED | OUTPATIENT
Start: 2024-06-22 | End: 2024-06-22

## 2024-06-22 RX ORDER — NALOXONE HYDROCHLORIDE 4 MG/.1ML
SPRAY NASAL
Qty: 2 EACH | Refills: 0 | Status: SHIPPED | OUTPATIENT
Start: 2024-06-22

## 2024-06-22 RX ORDER — HYDROCODONE BITARTRATE AND ACETAMINOPHEN 5; 325 MG/1; MG/1
1 TABLET ORAL EVERY 6 HOURS PRN
Qty: 10 TABLET | Refills: 0 | Status: SHIPPED | OUTPATIENT
Start: 2024-06-22

## 2024-06-22 RX ORDER — ONDANSETRON 4 MG/1
4 TABLET, ORALLY DISINTEGRATING ORAL EVERY 8 HOURS PRN
Qty: 10 TABLET | Refills: 0 | Status: SHIPPED | OUTPATIENT
Start: 2024-06-22

## 2024-06-22 RX ORDER — KETOROLAC TROMETHAMINE 10 MG/1
10 TABLET, FILM COATED ORAL EVERY 6 HOURS PRN
Qty: 10 TABLET | Refills: 0 | Status: SHIPPED | OUTPATIENT
Start: 2024-06-22

## 2024-06-22 RX ORDER — ONDANSETRON 2 MG/ML
4 INJECTION INTRAMUSCULAR; INTRAVENOUS ONCE
Status: COMPLETED | OUTPATIENT
Start: 2024-06-22 | End: 2024-06-22

## 2024-06-22 RX ADMIN — MORPHINE SULFATE 4 MG: 4 INJECTION, SOLUTION INTRAMUSCULAR; INTRAVENOUS at 01:26

## 2024-06-22 RX ADMIN — SODIUM CHLORIDE 1000 ML: 900 INJECTION, SOLUTION INTRAVENOUS at 01:26

## 2024-06-22 RX ADMIN — ONDANSETRON 4 MG: 2 INJECTION INTRAMUSCULAR; INTRAVENOUS at 01:26

## 2024-06-22 RX ADMIN — IOPAMIDOL 100 ML: 612 INJECTION, SOLUTION INTRAVENOUS at 02:23

## 2024-06-22 RX ADMIN — KETOROLAC TROMETHAMINE 30 MG: 30 INJECTION, SOLUTION INTRAMUSCULAR; INTRAVENOUS at 03:40

## 2024-06-22 NOTE — DISCHARGE INSTRUCTIONS
As discussed, your CT today showed a mass on your right 10th rib - this is of unclear etiology.  It is important that you follow up with your primary doctor for further evaluation of this   
decreased strength/impaired coordination/decreased ROM

## 2024-06-22 NOTE — ED PROVIDER NOTES
Subjective   History of Present Illness  Pt presents to the  with report of RLQ abdominal pain. Has had some vomiting/diarrhea with this.  Had episode on Monday that resolved but tonight pain is persistent. No injuries. No dysuria/hematuria.  Denies any testicular pain/swelling.          Review of Systems   Constitutional:  Negative for chills and fever.   Respiratory:  Negative for shortness of breath.    Cardiovascular:  Negative for chest pain.   Gastrointestinal:  Positive for abdominal pain, diarrhea, nausea and vomiting.   Genitourinary:  Negative for dysuria and testicular pain.   Skin:  Negative for rash.   All other systems reviewed and are negative.      Past Medical History:   Diagnosis Date    History of colon polyps     Hypertension        No Known Allergies    Past Surgical History:   Procedure Laterality Date    COLONOSCOPY  10/12/2015    One 6mm hyperplastic polyp in the rectum; Repeat 5 years    COLONOSCOPY N/A 1/27/2021    Procedure: COLONOSCOPY WITH ANESTHESIA;  Surgeon: Melinda Burns MD;  Location: [facility] ENDOSCOPY;  Service: Gastroenterology;  Laterality: N/A;  pre op: hx polyps  post op: int hemmhroids   PCP: Anupam Quintana MD    VASECTOMY         Family History   Problem Relation Age of Onset    Brain cancer Father     No Known Problems Mother     Colon cancer Neg Hx     Colon polyps Neg Hx     Esophageal cancer Neg Hx     Liver cancer Neg Hx     Liver disease Neg Hx     Rectal cancer Neg Hx     Stomach cancer Neg Hx        Social History     Socioeconomic History    Marital status:    Tobacco Use    Smoking status: Former     Types: Cigarettes    Smokeless tobacco: Never   Vaping Use    Vaping status: Never Used   Substance and Sexual Activity    Alcohol use: Not Currently    Drug use: Never    Sexual activity: Defer           Objective   Physical Exam  Vitals and nursing note reviewed.   Constitutional:       General: He is not in acute distress.  HENT:      Head:  Normocephalic.      Mouth/Throat:      Mouth: Mucous membranes are moist.   Eyes:      General: No scleral icterus.  Cardiovascular:      Rate and Rhythm: Normal rate and regular rhythm.      Heart sounds: Normal heart sounds.   Pulmonary:      Effort: Pulmonary effort is normal.      Breath sounds: Normal breath sounds.   Abdominal:      General: Abdomen is flat. Bowel sounds are normal.      Palpations: Abdomen is soft.      Tenderness: There is abdominal tenderness in the right lower quadrant. There is rebound. Positive signs include McBurney's sign.   Skin:     General: Skin is warm and dry.      Capillary Refill: Capillary refill takes less than 2 seconds.   Neurological:      Mental Status: He is alert.         Procedures           ED Course      Labs Reviewed   COMPREHENSIVE METABOLIC PANEL - Abnormal; Notable for the following components:       Result Value    Glucose 175 (*)     BUN 24 (*)     ALT (SGPT) 69 (*)     Alkaline Phosphatase 172 (*)     Total Bilirubin 2.6 (*)     All other components within normal limits    Narrative:     GFR Normal >60  Chronic Kidney Disease <60  Kidney Failure <15     URINALYSIS W/ MICROSCOPIC IF INDICATED (NO CULTURE) - Abnormal; Notable for the following components:    Appearance, UA Cloudy (*)     Glucose,  mg/dL (Trace) (*)     Ketones, UA Trace (*)     Blood, UA Moderate (2+) (*)     Protein, UA 30 mg/dL (1+) (*)     All other components within normal limits   CBC WITH AUTO DIFFERENTIAL - Abnormal; Notable for the following components:    WBC 15.98 (*)     RDW-SD 36.3 (*)     Neutrophil % 89.9 (*)     Lymphocyte % 6.1 (*)     Monocyte % 3.3 (*)     Eosinophil % 0.1 (*)     Neutrophils, Absolute 14.36 (*)     Immature Grans, Absolute 0.07 (*)     All other components within normal limits   URINALYSIS, MICROSCOPIC ONLY - Abnormal; Notable for the following components:    RBC, UA Too Numerous to Count (*)     All other components within normal limits   LIPASE - Normal    CBC AND DIFFERENTIAL    Narrative:     The following orders were created for panel order CBC & Differential.  Procedure                               Abnormality         Status                     ---------                               -----------         ------                     CBC Auto Differential[[id number]]        Abnormal            Final result                 Please view results for these tests on the individual orders.     CT Abdomen Pelvis With Contrast    (Results Pending)                                            Medical Decision Making  Pt stable in EC - NAD att.  No evid of appy/obst/perf. Pt has mildly elevated bili/AP - is s/p cholecystectomy - no RUQ pain s/o choledocholithiasis.  He does have some liver steatosis.  Also incidentally noted is a R 10th rib mass - this is of unclear etiology and recommend outpt f/u.  He is noted to have 3mm R ureteral stone.  This is likely etiology of his pain. Will d/c to home at this point with toradol/norco/flomax/zofran.  Recommend outpt f/u.    Amount and/or Complexity of Data Reviewed  Labs: ordered.  Radiology: ordered.    Risk  Prescription drug management.        Final diagnoses:   Kidney stone   Mass of chest wall       ED Disposition  ED Disposition       ED Disposition   Discharge    Condition   Stable    Comment   --               Anupam Thorne MD  [address]    [facility] [place] [address]  [phone]          Rigo Santoyo MD  [address]  [facility] [id number] [phone]    Call            Medication List        New Prescriptions      ketorolac 10 MG tablet  Commonly known as: TORADOL  Take 1 tablet by mouth Every 6 (Six) Hours As Needed for Moderate Pain.     naloxone 4 MG/0.1ML nasal spray  Commonly known as: NARCAN  Call 911. Don't prime. [place] in 1 nostril for overdose. Repeat in 2-3 minutes in other nostril if no or minimal breathing/responsiveness.     ondansetron ODT 4 MG disintegrating tablet  Commonly  known as: ZOFRAN-ODT  Place 1 tablet on the tongue Every 8 (Eight) Hours As Needed for Nausea or Vomiting.     tamsulosin 0.4 MG capsule 24 hr capsule  Commonly known as: FLOMAX  Take 1 capsule by mouth Every Night.            Changed      * HYDROcodone-acetaminophen 5-325 MG per tablet  Commonly known as: NORCO  Take 1 tablet by mouth Every 6 (Six) Hours As Needed for Moderate Pain.  What changed: Another medication with the same name was added. Make sure you understand how and when to take each.     * HYDROcodone-acetaminophen 5-325 MG per tablet  Commonly known as: NORCO  Take 1 tablet by mouth Every 6 (Six) Hours As Needed for Moderate Pain or Severe Pain.  What changed: You were already taking a medication with the same name, and this prescription was added. Make sure you understand how and when to take each.           * This list has 2 medication(s) that are the same as other medications prescribed for you. Read the directions carefully, and ask your doctor or other care provider to review them with you.                   Where to Get Your Medications        These medications were sent to [id number] - [place] - [address] - [phone]  - [phone] FX  [address] [place] [address]      Phone: [phone]   HYDROcodone-acetaminophen 5-325 MG per tablet  ketorolac 10 MG tablet  naloxone 4 MG/0.1ML nasal spray  ondansetron ODT 4 MG disintegrating tablet  tamsulosin 0.4 MG capsule 24 hr capsule            Casper Villegas,   06/22/24 0106       Casper Villegas,   06/22/24 2647

## 2024-06-24 DIAGNOSIS — N20.0 KIDNEY STONE: Primary | ICD-10-CM

## 2024-06-25 ENCOUNTER — HOSPITAL ENCOUNTER (OUTPATIENT)
Dept: CT IMAGING | Facility: HOSPITAL | Age: 64
Discharge: HOME | End: 2024-06-25
Payer: MEDICARE

## 2024-06-25 ENCOUNTER — HOSPITAL ENCOUNTER (OUTPATIENT)
Dept: GENERAL RADIOLOGY | Facility: HOSPITAL | Age: 64
Discharge: HOME | End: 2024-06-25
Payer: MEDICARE

## 2024-06-25 ENCOUNTER — PATIENT ROUNDING (BHMG ONLY) (OUTPATIENT)
Dept: UROLOGY | Facility: CLINIC | Age: 64
End: 2024-06-25
Payer: MEDICARE

## 2024-06-25 ENCOUNTER — OFFICE VISIT (OUTPATIENT)
Dept: UROLOGY | Facility: CLINIC | Age: 64
End: 2024-06-25
Payer: MEDICARE

## 2024-06-25 VITALS — TEMPERATURE: 98.1 F | HEIGHT: 71 IN | BODY MASS INDEX: 36.76 KG/M2 | WEIGHT: 262.6 LBS

## 2024-06-25 DIAGNOSIS — N20.1 RIGHT URETERAL STONE: ICD-10-CM

## 2024-06-25 DIAGNOSIS — N20.1 RIGHT URETERAL STONE: Primary | ICD-10-CM

## 2024-06-25 DIAGNOSIS — N20.0 KIDNEY STONE: ICD-10-CM

## 2024-06-25 LAB
BILIRUB BLD-MCNC: ABNORMAL MG/DL
CLARITY, POC: CLEAR
COLOR UR: YELLOW
GLUCOSE UR STRIP-MCNC: NEGATIVE MG/DL
KETONES UR QL: ABNORMAL
LEUKOCYTE EST, POC: NEGATIVE
NITRITE UR-MCNC: NEGATIVE MG/ML
PH UR: 6 [PH] (ref 5–8)
PROT UR STRIP-MCNC: ABNORMAL MG/DL
RBC # UR STRIP: ABNORMAL /UL
SP GR UR: 1.02 (ref 1–1.03)
UROBILINOGEN UR QL: ABNORMAL

## 2024-06-25 PROCEDURE — 74018 RADEX ABDOMEN 1 VIEW: CPT

## 2024-06-25 PROCEDURE — 1159F MED LIST DOCD IN RCRD: CPT | Performed by: PHYSICIAN ASSISTANT

## 2024-06-25 PROCEDURE — 99204 OFFICE O/P NEW MOD 45 MIN: CPT | Performed by: PHYSICIAN ASSISTANT

## 2024-06-25 PROCEDURE — 81001 URINALYSIS AUTO W/SCOPE: CPT | Performed by: PHYSICIAN ASSISTANT

## 2024-06-25 PROCEDURE — 1160F RVW MEDS BY RX/DR IN RCRD: CPT | Performed by: PHYSICIAN ASSISTANT

## 2024-06-25 PROCEDURE — 74176 CT ABD & PELVIS W/O CONTRAST: CPT

## 2024-06-25 NOTE — PROGRESS NOTES
June 25, 2024    Hello, may I speak with Issac Back?    My name is Patt      I am  with [facility]  [address], [address]  [place] [address] [phone].    Before we get started may I verify your date of birth? 1960    I am calling to officially welcome you to our practice and ask about your recent visit. Is this a good time to talk? yes    Tell me about your visit with us. What things went well? Everything went well and everyone was nice.       We're always looking for ways to make our patients' experiences even better. Do you have recommendations on ways we may improve?  no    Overall were you satisfied with your first visit to our practice? yes       I appreciate you taking the time to speak with me today. Is there anything else I can do for you? no      Thank you, and have a great day.

## 2024-06-25 NOTE — PROGRESS NOTES
Subjective    Mr. Back is 64 y.o. male    Chief Complaint: Obstructing Stone    History of Present Illness  Urolithiasis  Patient complains of right abdominal pain without radiation to the groin and testicles. Onset of symptoms was abrupt starting 3 days ago with stable course since that time. Patient describes the pain as none,  no pain now  and rated as no and severe.  Patient is asymptomatic at this time but pain was severe when he went to the ER.  The patient has had nausea and vomiting. There has been no fever or chills. The patient is not complaining of dysuria, frequency, or urgency.  Previous management of stones includes none.  Prior history of kidney stones CT scan showed a 3 mm stone in the right mid ureter around the L4 level.  The KUB does not clearly visualize a stone no other obvious stones were seen on the CT scan.  Patient is producing urine and denies any fever or chills at this time.      The following portions of the patient's history were reviewed and updated as appropriate: allergies, current medications, past family history, past medical history, past social history, past surgical history and problem list.    Review of Systems   Constitutional:  Negative for chills and fever.   Gastrointestinal:  Negative for abdominal pain, anal bleeding and blood in stool.   Genitourinary:  Negative for dysuria and hematuria.         Current Outpatient Medications:     amLODIPine-benazepril (LOTREL 5-20) 5-20 MG per capsule, Take 1 capsule by mouth Daily., Disp: , Rfl:     HYDROcodone-acetaminophen (NORCO) 5-325 MG per tablet, Take 1 tablet by mouth Every 6 (Six) Hours As Needed for Moderate Pain., Disp: 15 tablet, Rfl: 0    HYDROcodone-acetaminophen (NORCO) 5-325 MG per tablet, Take 1 tablet by mouth Every 6 (Six) Hours As Needed for Moderate Pain or Severe Pain., Disp: 10 tablet, Rfl: 0    ketorolac (TORADOL) 10 MG tablet, Take 1 tablet by mouth Every 6 (Six) Hours As Needed for Moderate Pain., Disp: 10  "tablet, Rfl: 0    naloxone (NARCAN) 4 MG/0.1ML nasal spray, Call 911. Don't prime. [place] in 1 nostril for overdose. Repeat in 2-3 minutes in other nostril if no or minimal breathing/responsiveness., Disp: 2 each, Rfl: 0    ondansetron ODT (ZOFRAN-ODT) 4 MG disintegrating tablet, Place 1 tablet on the tongue Every 8 (Eight) Hours As Needed for Nausea or Vomiting., Disp: 10 tablet, Rfl: 0    tamsulosin (FLOMAX) 0.4 MG capsule 24 hr capsule, Take 1 capsule by mouth Every Night., Disp: 7 capsule, Rfl: 0    Past Medical History:   Diagnosis Date    History of colon polyps     Hypertension        Past Surgical History:   Procedure Laterality Date    COLONOSCOPY  10/12/2015    One 6mm hyperplastic polyp in the rectum; Repeat 5 years    COLONOSCOPY N/A 1/27/2021    Procedure: COLONOSCOPY WITH ANESTHESIA;  Surgeon: Melinda Burns MD;  Location: [facility] ENDOSCOPY;  Service: Gastroenterology;  Laterality: N/A;  pre op: hx polyps  post op: int hemmhroids   PCP: Anupam Quintana MD    VASECTOMY         Social History     Socioeconomic History    Marital status:    Tobacco Use    Smoking status: Former     Types: Cigarettes    Smokeless tobacco: Never   Vaping Use    Vaping status: Never Used   Substance and Sexual Activity    Alcohol use: Not Currently    Drug use: Never    Sexual activity: Defer       Family History   Problem Relation Age of Onset    Brain cancer Father     No Known Problems Mother     Colon cancer Neg Hx     Colon polyps Neg Hx     Esophageal cancer Neg Hx     Liver cancer Neg Hx     Liver disease Neg Hx     Rectal cancer Neg Hx     Stomach cancer Neg Hx        Objective    Temp 98.1 °F (36.7 °C)   Ht 180.3 cm (71\")   Wt 119 kg (262 lb 9.6 oz)   BMI 36.63 kg/m²     Physical Exam  Constitutional:       Appearance: Normal appearance.   HENT:      Head: Normocephalic and atraumatic.   Pulmonary:      Effort: Pulmonary effort is normal.   Skin:     Coloration: Skin is not pale.   Neurological:      " Mental Status: He is alert.   Psychiatric:         Mood and Affect: Mood normal.         Behavior: Behavior normal.             Results for orders placed or performed in visit on 06/25/24   POC Urinalysis Dipstick, Multipro    Specimen: Urine   Result Value Ref Range    Color Yellow Yellow, Straw, Dark Yellow, Elisa    Clarity, UA Clear Clear    Glucose, UA Negative Negative mg/dL    Bilirubin Small (1+) (A) Negative    Ketones, UA 15 mg/dL (A) Negative    Specific Gravity  1.025 1.005 - 1.030    Blood, UA Trace (A) Negative    pH, Urine 6.0 5.0 - 8.0    Protein, POC 30 mg/dL (A) Negative mg/dL    Urobilinogen, UA 1 E.U./dL (A) Normal, 0.2 E.U./dL    Nitrite, UA Negative Negative    Leukocytes Negative Negative     KUB independent review    A KUB is available for me to review today.  The image is inspected for a bowel gas pattern and the general bone structure of the spine and pelvis. The kidneys are then inspected closely.  Renal outline is noted if identifiable. The kidney, collecting system, and anticipated path of the ureter are examined for calcifications including those in the true pelvis.  This film reveals:    On the right there are no calcificaitons seen in the kidney or the expected course of the ureter. .    On the left there are no calcificaitons seen in the kidney or the expected course of the ureter. .  Assessment and Plan    Diagnoses and all orders for this visit:    1. Right ureteral stone (Primary)  -     Cancel: POC Urinalysis Dipstick, Multipro  -     CT Abdomen Pelvis Without Contrast; Future  -     POC Urinalysis Dipstick, Multipl    The 3 mm right mid ureteral stone is not clearly visualized on the KUB patient states yesterday and today he has been asymptomatic he is producing urine he is eating and drinking no fever chills nausea vomiting or flank pain.  He does not seen the stone pass definitively had no other stones in the ureters.  No other stones in either kidney.    I will have him get a  CT scan to confirm whether stone is still present or not.  If stone is still present I feel given its size and his stable symptoms we could continue to see if he could pass the stone.    If it has passed we will see him to in the future with imaging most likely KUB in 6 months.    Gave him stone dietary sheet for stone prevention.

## 2024-06-26 ENCOUNTER — TELEPHONE (OUTPATIENT)
Dept: UROLOGY | Facility: CLINIC | Age: 64
End: 2024-06-26
Payer: MEDICARE

## 2024-06-26 DIAGNOSIS — N20.1 RIGHT URETERAL STONE: Primary | ICD-10-CM

## 2024-06-26 RX ORDER — TAMSULOSIN HYDROCHLORIDE 0.4 MG/1
1 CAPSULE ORAL DAILY
Qty: 30 CAPSULE | Refills: 0 | Status: SHIPPED | OUTPATIENT
Start: 2024-06-26

## 2024-06-26 NOTE — TELEPHONE ENCOUNTER
----- Message from Keron Wright sent at 6/26/2024  8:45 AM CDT -----  Regarding: CT  There is a 3 mm stone in the right ureter but there is no obstruction I would continue to try to pass the stone given the size.  Increase fluids continue tamsulosin strain urine if he does not have a strainer he can come get 1.  Given follow-up in 2 weeks the last patient passes the stone sooner or symptoms should worsen in the meantime.  ----- Message -----  From: Elvin Rad Results [place]  Sent: 6/25/2024  12:40 PM CDT  To: ROBERTO Knight

## 2024-06-26 NOTE — TELEPHONE ENCOUNTER
Called pt. To let him know Ct results, he v/u. Pt. Needs refill on Flomax, will send to [facility] on [address]. Pt. Transferred to make a 2 week f/u with PB prior.

## 2024-06-27 NOTE — PROGRESS NOTES
Subjective    Mr. Back is 64 y.o. male    Chief Complaint: Right Ureteral Stone    History of Present Illness  Patient presents for 2-week follow-up with history of known 3 mm stone in the right mid ureter.  Patient got a KUB prior to his appointment today he has not passed the stone he is aware of he had an episode of increased pain and feeling nauseated.  Essentially asymptomatic no fever chills nausea vomiting gross hematuria or urinary tract infections.  Has no urinary tract difficulties he is passing urine easily.  His urine is clear today.      The following portions of the patient's history were reviewed and updated as appropriate: allergies, current medications, past family history, past medical history, past social history, past surgical history and problem list.    Review of Systems   Constitutional: Negative.    Genitourinary: Negative.          Current Outpatient Medications:     amLODIPine-benazepril (LOTREL 5-20) 5-20 MG per capsule, Take 1 capsule by mouth Daily., Disp: , Rfl:     HYDROcodone-acetaminophen (NORCO) 5-325 MG per tablet, Take 1 tablet by mouth Every 6 (Six) Hours As Needed for Moderate Pain or Severe Pain., Disp: 10 tablet, Rfl: 0    ketorolac (TORADOL) 10 MG tablet, Take 1 tablet by mouth Every 6 (Six) Hours As Needed for Moderate Pain., Disp: 10 tablet, Rfl: 0    ondansetron ODT (ZOFRAN-ODT) 4 MG disintegrating tablet, Place 1 tablet on the tongue Every 8 (Eight) Hours As Needed for Nausea or Vomiting., Disp: 10 tablet, Rfl: 0    tamsulosin (FLOMAX) 0.4 MG capsule 24 hr capsule, Take 1 capsule by mouth Every Night., Disp: 7 capsule, Rfl: 0    tamsulosin (FLOMAX) 0.4 MG capsule 24 hr capsule, Take 1 capsule by mouth Daily., Disp: 30 capsule, Rfl: 0    HYDROcodone-acetaminophen (NORCO) 5-325 MG per tablet, Take 1 tablet by mouth Every 6 (Six) Hours As Needed for Moderate Pain. (Patient not taking: Reported on 7/10/2024), Disp: 15 tablet, Rfl: 0    naloxone (NARCAN) 4 MG/0.1ML nasal  "spray, Call 911. Don't prime. [place] in 1 nostril for overdose. Repeat in 2-3 minutes in other nostril if no or minimal breathing/responsiveness. (Patient not taking: Reported on 7/10/2024), Disp: 2 each, Rfl: 0    Past Medical History:   Diagnosis Date    History of colon polyps     Hypertension        Past Surgical History:   Procedure Laterality Date    COLONOSCOPY  10/12/2015    One 6mm hyperplastic polyp in the rectum; Repeat 5 years    COLONOSCOPY N/A 1/27/2021    Procedure: COLONOSCOPY WITH ANESTHESIA;  Surgeon: Melinda Burns MD;  Location: [facility] ENDOSCOPY;  Service: Gastroenterology;  Laterality: N/A;  pre op: hx polyps  post op: int hemmhroids   PCP: Anupam Quintana MD    VASECTOMY         Social History     Socioeconomic History    Marital status:    Tobacco Use    Smoking status: Former     Types: Cigarettes    Smokeless tobacco: Never   Vaping Use    Vaping status: Never Used   Substance and Sexual Activity    Alcohol use: Not Currently    Drug use: Never    Sexual activity: Defer       Family History   Problem Relation Age of Onset    Brain cancer Father     No Known Problems Mother     Colon cancer Neg Hx     Colon polyps Neg Hx     Esophageal cancer Neg Hx     Liver cancer Neg Hx     Liver disease Neg Hx     Rectal cancer Neg Hx     Stomach cancer Neg Hx        Objective    Temp 97.8 °F (36.6 °C)   Ht 180.3 cm (71\")   Wt 121 kg (267 lb)   BMI 37.24 kg/m²     Physical Exam  Vitals reviewed.   Constitutional:       General: He is not in acute distress.     Appearance: Normal appearance. He is not toxic-appearing.   HENT:      Head: Normocephalic and atraumatic.   Pulmonary:      Effort: Pulmonary effort is normal.   Skin:     Coloration: Skin is not pale.   Neurological:      Mental Status: He is alert.   Psychiatric:         Mood and Affect: Mood normal.         Behavior: Behavior normal.             Results for orders placed or performed in visit on 06/25/24   POC Urinalysis Dipstick, " Multipro    Specimen: Urine   Result Value Ref Range    Color Yellow Yellow, Straw, Dark Yellow, Elisa    Clarity, UA Clear Clear    Glucose, UA Negative Negative mg/dL    Bilirubin Small (1+) (A) Negative    Ketones, UA 15 mg/dL (A) Negative    Specific Gravity  1.025 1.005 - 1.030    Blood, UA Trace (A) Negative    pH, Urine 6.0 5.0 - 8.0    Protein, POC 30 mg/dL (A) Negative mg/dL    Urobilinogen, UA 1 E.U./dL (A) Normal, 0.2 E.U./dL    Nitrite, UA Negative Negative    Leukocytes Negative Negative     KUB independent review    A KUB is available for me to review today.  The image is inspected for a bowel gas pattern and the general bone structure of the spine and pelvis. The kidneys are then inspected closely.  Renal outline is noted if identifiable. The kidney, collecting system, and anticipated path of the ureter are examined for calcifications including those in the true pelvis.  This film reveals:    On the right there are no calcificaitons seen in the kidney or the expected course of the ureter.     On the left there are no calcificaitons seen in the kidney or the expected course of the ureter.   Assessment and Plan    Diagnoses and all orders for this visit:    1. Right ureteral stone (Primary)  -     POC Urinalysis Dipstick, Multipro  -     XR Abdomen KUB; Future      I do not see a calcification in the vicinity of the right ureter.  Patient is not sure if he is passed a stone or not essentially asymptomatic at this point we will have her follow-up 1 month we will get an ultrasound at that time as we decided not to get another CT.  If symptoms should recur will have him return we will have to get a CT at that point.

## 2024-07-09 ENCOUNTER — TELEPHONE (OUTPATIENT)
Dept: UROLOGY | Facility: CLINIC | Age: 64
End: 2024-07-09
Payer: MEDICARE

## 2024-07-10 ENCOUNTER — HOSPITAL ENCOUNTER (OUTPATIENT)
Dept: GENERAL RADIOLOGY | Facility: HOSPITAL | Age: 64
Discharge: HOME | End: 2024-07-10
Admitting: PHYSICIAN ASSISTANT
Payer: MEDICARE

## 2024-07-10 ENCOUNTER — OFFICE VISIT (OUTPATIENT)
Dept: UROLOGY | Facility: CLINIC | Age: 64
End: 2024-07-10
Payer: MEDICARE

## 2024-07-10 VITALS — BODY MASS INDEX: 37.38 KG/M2 | TEMPERATURE: 97.8 F | HEIGHT: 71 IN | WEIGHT: 267 LBS

## 2024-07-10 DIAGNOSIS — N20.1 RIGHT URETERAL STONE: Primary | ICD-10-CM

## 2024-07-10 DIAGNOSIS — N20.1 RIGHT URETERAL STONE: ICD-10-CM

## 2024-07-10 LAB
BILIRUB BLD-MCNC: NEGATIVE MG/DL
CLARITY, POC: CLEAR
COLOR UR: YELLOW
GLUCOSE UR STRIP-MCNC: NEGATIVE MG/DL
KETONES UR QL: NEGATIVE
LEUKOCYTE EST, POC: NEGATIVE
NITRITE UR-MCNC: NEGATIVE MG/ML
PH UR: 6.5 [PH] (ref 5–8)
PROT UR STRIP-MCNC: ABNORMAL MG/DL
RBC # UR STRIP: NEGATIVE /UL
SP GR UR: 1.02 (ref 1–1.03)
UROBILINOGEN UR QL: NORMAL

## 2024-07-10 PROCEDURE — 74018 RADEX ABDOMEN 1 VIEW: CPT

## 2024-07-25 ENCOUNTER — TELEPHONE (OUTPATIENT)
Dept: UROLOGY | Facility: CLINIC | Age: 64
End: 2024-07-25

## 2024-07-25 NOTE — TELEPHONE ENCOUNTER
Provider: ROBERTO COLBERT      The [facility] received a fax that requires your attention. The document has been indexed to the patient's chart for your review.     Reason for sending: REVIEW / SIGNATURE     Documents Description: 90 DAY PRESCRIPTION REQUEST FOR TAMSULOSIN HCL 0.4 MG CAPSULE     Name of Sender: [facility]     Date Indexed: 07/25/24    Notes (if needed): INDEXED INTO CHART AS EXT MED RECS 7/21/24.

## 2024-07-29 NOTE — PROGRESS NOTES
Subjective    Mr. Back is 64 y.o. male    Chief Complaint: Right ureteral stone    History of Present Illness  Patient is a 64-year-old gentleman who presents for follow-up after getting renal ultrasound.  Patient had a known 3 mm stone in the right mid ureter.  When he got a KUB at his last visit which does not show any obvious stone but patient was not aware of passing a stone has not been symptomatic since last seen.  His urine is clear.  Ultrasound show hydronephrosis or abnormalities.    The following portions of the patient's history were reviewed and updated as appropriate: allergies, current medications, past family history, past medical history, past social history, past surgical history and problem list.    Review of Systems   Genitourinary: Negative.          Current Outpatient Medications:     amLODIPine-benazepril (LOTREL 5-20) 5-20 MG per capsule, Take 1 capsule by mouth Daily., Disp: , Rfl:     HYDROcodone-acetaminophen (NORCO) 5-325 MG per tablet, Take 1 tablet by mouth Every 6 (Six) Hours As Needed for Moderate Pain or Severe Pain., Disp: 10 tablet, Rfl: 0    ketorolac (TORADOL) 10 MG tablet, Take 1 tablet by mouth Every 6 (Six) Hours As Needed for Moderate Pain., Disp: 10 tablet, Rfl: 0    ondansetron ODT (ZOFRAN-ODT) 4 MG disintegrating tablet, Place 1 tablet on the tongue Every 8 (Eight) Hours As Needed for Nausea or Vomiting., Disp: 10 tablet, Rfl: 0    tamsulosin (FLOMAX) 0.4 MG capsule 24 hr capsule, Take 1 capsule by mouth Every Night., Disp: 7 capsule, Rfl: 0    tamsulosin (FLOMAX) 0.4 MG capsule 24 hr capsule, Take 1 capsule by mouth Daily., Disp: 30 capsule, Rfl: 0    HYDROcodone-acetaminophen (NORCO) 5-325 MG per tablet, Take 1 tablet by mouth Every 6 (Six) Hours As Needed for Moderate Pain. (Patient not taking: Reported on 7/10/2024), Disp: 15 tablet, Rfl: 0    naloxone (NARCAN) 4 MG/0.1ML nasal spray, Call 911. Don't prime. [place] in 1 nostril for overdose. Repeat in 2-3 minutes in  "other nostril if no or minimal breathing/responsiveness. (Patient not taking: Reported on 7/10/2024), Disp: 2 each, Rfl: 0    Past Medical History:   Diagnosis Date    History of colon polyps     Hypertension        Past Surgical History:   Procedure Laterality Date    COLONOSCOPY  10/12/2015    One 6mm hyperplastic polyp in the rectum; Repeat 5 years    COLONOSCOPY N/A 1/27/2021    Procedure: COLONOSCOPY WITH ANESTHESIA;  Surgeon: Melinda Burns MD;  Location: [facility] ENDOSCOPY;  Service: Gastroenterology;  Laterality: N/A;  pre op: hx polyps  post op: int hemmhroids   PCP: Anupam Quintana MD    VASECTOMY         Social History     Socioeconomic History    Marital status:    Tobacco Use    Smoking status: Former     Types: Cigarettes    Smokeless tobacco: Never   Vaping Use    Vaping status: Never Used   Substance and Sexual Activity    Alcohol use: Not Currently    Drug use: Never    Sexual activity: Defer       Family History   Problem Relation Age of Onset    Brain cancer Father     No Known Problems Mother     Colon cancer Neg Hx     Colon polyps Neg Hx     Esophageal cancer Neg Hx     Liver cancer Neg Hx     Liver disease Neg Hx     Rectal cancer Neg Hx     Stomach cancer Neg Hx        Objective    Temp 98.1 °F (36.7 °C)   Ht 180.3 cm (71\")   Wt 120 kg (265 lb)   BMI 36.96 kg/m²     Physical Exam  Constitutional:       Appearance: Normal appearance.   HENT:      Head: Normocephalic and atraumatic.   Pulmonary:      Effort: Pulmonary effort is normal.   Skin:     Coloration: Skin is not pale.   Neurological:      Mental Status: He is alert.   Psychiatric:         Mood and Affect: Mood normal.         Behavior: Behavior normal.             Results for orders placed or performed in visit on 08/07/24   POC Urinalysis Dipstick, Multipro    Specimen: Urine   Result Value Ref Range    Color Yellow Yellow, Straw, Dark Yellow, Elisa    Clarity, UA Clear Clear    Glucose, UA Negative Negative mg/dL    " Bilirubin Negative Negative    Ketones, UA Negative Negative    Specific Gravity  1.030 1.005 - 1.030    Blood, UA Trace (A) Negative    pH, Urine 5.5 5.0 - 8.0    Protein, POC 30 mg/dL (A) Negative mg/dL    Urobilinogen, UA 0.2 E.U./dL Normal, 0.2 E.U./dL    Nitrite, UA Negative Negative    Leukocytes Negative Negative     Renal ultrasound independent review:  I reviewed the renal ultrasound I saw no evidence of renal masses no hydronephrosis or kidney stones.  Assessment and Plan    Diagnoses and all orders for this visit:    1. Right ureteral stone (Primary)  -     POC Urinalysis Dipstick, Multipro    2. Kidney stone  -     XR Abdomen KUB; Future      Assumed patient passed stone his renal ultrasound shows no hydronephrosis or any abnormalities.  Has been asymptomatic.  He had a 3 mm right distal stone that he never noticed passing.  Follow-up 6 months.

## 2024-08-05 ENCOUNTER — HOSPITAL ENCOUNTER (OUTPATIENT)
Dept: ULTRASOUND IMAGING | Facility: HOSPITAL | Age: 64
Discharge: HOME | End: 2024-08-05
Admitting: PHYSICIAN ASSISTANT
Payer: MEDICARE

## 2024-08-05 DIAGNOSIS — N20.1 RIGHT URETERAL STONE: ICD-10-CM

## 2024-08-05 PROCEDURE — 76775 US EXAM ABDO BACK WALL LIM: CPT

## 2024-08-07 ENCOUNTER — OFFICE VISIT (OUTPATIENT)
Dept: UROLOGY | Facility: CLINIC | Age: 64
End: 2024-08-07
Payer: MEDICARE

## 2024-08-07 VITALS — WEIGHT: 265 LBS | BODY MASS INDEX: 37.1 KG/M2 | HEIGHT: 71 IN | TEMPERATURE: 98.1 F

## 2024-08-07 DIAGNOSIS — N20.1 RIGHT URETERAL STONE: Primary | ICD-10-CM

## 2024-08-07 DIAGNOSIS — M89.9 RIB LESION: ICD-10-CM

## 2024-08-07 DIAGNOSIS — N20.0 KIDNEY STONE: ICD-10-CM

## 2024-08-07 LAB
BILIRUB BLD-MCNC: NEGATIVE MG/DL
CLARITY, POC: CLEAR
COLOR UR: YELLOW
GLUCOSE UR STRIP-MCNC: NEGATIVE MG/DL
KETONES UR QL: NEGATIVE
LEUKOCYTE EST, POC: NEGATIVE
NITRITE UR-MCNC: NEGATIVE MG/ML
PH UR: 5.5 [PH] (ref 5–8)
PROT UR STRIP-MCNC: ABNORMAL MG/DL
RBC # UR STRIP: ABNORMAL /UL
SP GR UR: 1.03 (ref 1–1.03)
UROBILINOGEN UR QL: ABNORMAL

## 2024-08-29 ENCOUNTER — OFFICE VISIT (OUTPATIENT)
Dept: CARDIAC SURGERY | Facility: CLINIC | Age: 64
End: 2024-08-29
Payer: MEDICARE

## 2024-08-29 VITALS
WEIGHT: 264 LBS | HEART RATE: 64 BPM | OXYGEN SATURATION: 96 % | HEIGHT: 71 IN | BODY MASS INDEX: 36.96 KG/M2 | DIASTOLIC BLOOD PRESSURE: 84 MMHG | SYSTOLIC BLOOD PRESSURE: 152 MMHG

## 2024-08-29 DIAGNOSIS — R22.2 CHEST WALL MASS: Primary | ICD-10-CM

## 2024-08-29 PROCEDURE — 99204 OFFICE O/P NEW MOD 45 MIN: CPT | Performed by: SURGERY

## 2024-08-29 NOTE — PROGRESS NOTES
Thoracic Surgery Consultation    Referring Physician: Dr. Anupam Thorne    Primary Care Physician: Dr. Anupam Thorne    Chief Complaint   Patient presents with    Rib Lesion     New patient referred from ROBERTO Leavitt         Subjective     History of Present Illness  The patient presents for evaluation of a mass on his 10th rib.    The mass was initially discovered through a CT scan, although he did not notice it himself. He can feel the mass upon touch, but it does not cause any discomfort. The radiologist's report confirmed the presence of the mass. He notes that the mass has been increasing in size recently.    He has no history of cancer and is generally in good health. He reports no cardiac or pulmonary issues and has never undergone surgery on his heart, lungs, or chest. The only surgical procedure he has had was a cholecystectomy in 2022, during which the mass was first noticed. He experienced some pain after this surgery, which he reported to Dr. Jewell. He was informed that the discomfort might be due to the way he was lifted from the operating table.    He is not diabetic but is considered borderline prediabetic. He does not take any blood thinners.    SOCIAL HISTORY  He does not smoke anymore.      Review of Systems     A complete review of systems was performed, is negative except stated above.    Past Medical History:   Diagnosis Date    History of colon polyps     Hypertension      Past Surgical History:   Procedure Laterality Date    COLONOSCOPY  10/12/2015    One 6mm hyperplastic polyp in the rectum; Repeat 5 years    COLONOSCOPY N/A 1/27/2021    Procedure: COLONOSCOPY WITH ANESTHESIA;  Surgeon: Melinda Burns MD;  Location: [facility] ENDOSCOPY;  Service: Gastroenterology;  Laterality: N/A;  pre op: hx polyps  post op: int hemmhroids   PCP: Anupam Quintana MD    VASECTOMY       Family History   Problem Relation Age of Onset    Brain cancer Father     No Known Problems Mother     Colon cancer  "Neg Hx     Colon polyps Neg Hx     Esophageal cancer Neg Hx     Liver cancer Neg Hx     Liver disease Neg Hx     Rectal cancer Neg Hx     Stomach cancer Neg Hx      Social History     Tobacco Use    Smoking status: Former     Current packs/day: 0.00     Average packs/day: 1 pack/day for 14.0 years (14.0 ttl pk-yrs)     Types: Cigarettes     Start date:      Quit date:      Years since quittin.6     Passive exposure: Past    Smokeless tobacco: Never   Vaping Use    Vaping status: Never Used   Substance Use Topics    Alcohol use: Not Currently    Drug use: Never     Current Outpatient Medications   Medication Sig Dispense Refill    amLODIPine-benazepril (LOTREL 5-20) 5-20 MG per capsule Take 1 capsule by mouth Daily.      IBUPROFEN PO Take  by mouth As Needed. OTC       No current facility-administered medications for this visit.     Allergies:  Patient has no known allergies.    Objective      Vital Signs  Visit Vitals  /84 (BP Location: Right arm, Patient Position: Sitting, Cuff Size: Adult)   Pulse 64   Ht 180.3 cm (71\")   Wt 120 kg (264 lb)   SpO2 96%   BMI 36.82 kg/m²         Physical Exam  Constitutional:       General: He is not in acute distress.     Appearance: He is well-developed. He is obese. He is not diaphoretic.   HENT:      Head: Normocephalic and atraumatic.      Right Ear: External ear normal.      Left Ear: External ear normal.   Eyes:      General:         Right eye: No discharge.         Left eye: No discharge.      Pupils: Pupils are equal, round, and reactive to light.   Neck:      Vascular: No JVD.      Trachea: No tracheal deviation.   Cardiovascular:      Rate and Rhythm: Normal rate and regular rhythm.      Heart sounds: Normal heart sounds. No murmur heard.  Pulmonary:      Effort: Pulmonary effort is normal. No respiratory distress.      Breath sounds: Normal breath sounds. No stridor. No wheezing.      Comments: Palpable mass along the lower lateral chest wall " consistent with imaging findings  Abdominal:      General: There is no distension.      Palpations: Abdomen is soft.      Tenderness: There is no abdominal tenderness. There is no guarding.   Musculoskeletal:         General: No tenderness or deformity. Normal range of motion.      Cervical back: Normal range of motion and neck supple.   Skin:     General: Skin is warm and dry.      Capillary Refill: Capillary refill takes less than 2 seconds.      Coloration: Skin is not pale.      Findings: No erythema or rash.   Neurological:      Mental Status: He is alert and oriented to person, place, and time.      Motor: No abnormal muscle tone.      Coordination: Coordination normal.   Psychiatric:         Behavior: Behavior normal.         Thought Content: Thought content normal.         Judgment: Judgment normal.        Physical Exam      Results Review:     WBC   Date Value Ref Range Status   06/22/2024 15.98 (H) 3.40 - 10.80 10*3/mm3 Final   10/03/2022 18.4 (H) 4.8 - 10.8 K/uL Final     RBC   Date Value Ref Range Status   06/22/2024 5.41 4.14 - 5.80 10*6/mm3 Final   10/03/2022 6.04 4.70 - 6.10 M/uL Final     Hemoglobin   Date Value Ref Range Status   06/22/2024 15.4 13.0 - 17.7 g/dL Final   10/03/2022 16.9 14.0 - 18.0 g/dL Final     Hematocrit   Date Value Ref Range Status   06/22/2024 44.1 37.5 - 51.0 % Final   10/03/2022 51.1 42.0 - 52.0 % Final     MCV   Date Value Ref Range Status   06/22/2024 81.5 79.0 - 97.0 fL Final   10/03/2022 84.6 80.0 - 94.0 fL Final     MCH   Date Value Ref Range Status   06/22/2024 28.5 26.6 - 33.0 pg Final   10/03/2022 28.0 27.0 - 31.0 pg Final     MCHC   Date Value Ref Range Status   06/22/2024 34.9 31.5 - 35.7 g/dL Final   10/03/2022 33.1 33.0 - 37.0 g/dL Final     RDW   Date Value Ref Range Status   06/22/2024 12.3 12.3 - 15.4 % Final   10/03/2022 12.5 11.5 - 14.5 % Final     RDW-SD   Date Value Ref Range Status   06/22/2024 36.3 (L) 37.0 - 54.0 fl Final     MPV   Date Value Ref Range  Status   06/22/2024 10.9 6.0 - 12.0 fL Final   10/03/2022 10.6 9.4 - 12.4 fL Final     Platelets   Date Value Ref Range Status   06/22/2024 265 140 - 450 10*3/mm3 Final   10/03/2022 263 130 - 400 K/uL Final     Neutrophil Rel %   Date Value Ref Range Status   10/03/2022 85.0 (H) 50.0 - 65.0 % Final     Neutrophil %   Date Value Ref Range Status   06/22/2024 89.9 (H) 42.7 - 76.0 % Final     Lymphocyte Rel %   Date Value Ref Range Status   10/03/2022 8.5 (L) 20.0 - 40.0 % Final     Lymphocyte %   Date Value Ref Range Status   06/22/2024 6.1 (L) 19.6 - 45.3 % Final     Monocyte Rel %   Date Value Ref Range Status   10/03/2022 4.8 0.0 - 10.0 % Final     Monocyte %   Date Value Ref Range Status   06/22/2024 3.3 (L) 5.0 - 12.0 % Final     Eosinophil Rel %   Date Value Ref Range Status   10/03/2022 0.9 0.0 - 5.0 % Final     Eosinophil %   Date Value Ref Range Status   06/22/2024 0.1 (L) 0.3 - 6.2 % Final     Basophil Rel %   Date Value Ref Range Status   10/03/2022 0.3 0.0 - 1.0 % Final     Basophil %   Date Value Ref Range Status   06/22/2024 0.2 0.0 - 1.5 % Final     Immature Grans %   Date Value Ref Range Status   06/22/2024 0.4 0.0 - 0.5 % Final     Neutrophils Absolute   Date Value Ref Range Status   10/03/2022 15.7 (H) 1.5 - 7.5 K/uL Final     Neutrophils, Absolute   Date Value Ref Range Status   06/22/2024 14.36 (H) 1.70 - 7.00 10*3/mm3 Final     Lymphocytes Absolute   Date Value Ref Range Status   10/03/2022 1.6 1.1 - 4.5 K/uL Final     Lymphocytes, Absolute   Date Value Ref Range Status   06/22/2024 0.98 0.70 - 3.10 10*3/mm3 Final     Monocytes Absolute   Date Value Ref Range Status   10/03/2022 0.90 0.00 - 0.90 K/uL Final     Monocytes, Absolute   Date Value Ref Range Status   06/22/2024 0.53 0.10 - 0.90 10*3/mm3 Final     Eosinophils Absolute   Date Value Ref Range Status   10/03/2022 0.20 0.00 - 0.60 K/uL Final     Eosinophils, Absolute   Date Value Ref Range Status   06/22/2024 0.01 0.00 - 0.40 10*3/mm3 Final      Basophils Absolute   Date Value Ref Range Status   10/03/2022 0.10 0.00 - 0.20 K/uL Final     Basophils, Absolute   Date Value Ref Range Status   06/22/2024 0.03 0.00 - 0.20 10*3/mm3 Final     Immature Grans, Absolute   Date Value Ref Range Status   06/22/2024 0.07 (H) 0.00 - 0.05 10*3/mm3 Final   10/03/2022 0.1 K/uL Final     nRBC   Date Value Ref Range Status   06/22/2024 0.0 0.0 - 0.2 /100 WBC Final     Glucose   Date Value Ref Range Status   06/22/2024 175 (H) 65 - 99 mg/dL Final   10/03/2022 140 (H) 74 - 109 mg/dL Final     Sodium   Date Value Ref Range Status   06/22/2024 139 136 - 145 mmol/L Final   10/03/2022 142 136 - 145 mmol/L Final     Potassium   Date Value Ref Range Status   06/22/2024 3.7 3.5 - 5.2 mmol/L Final   10/03/2022 3.9 3.5 - 5.0 mmol/L Final     CO2   Date Value Ref Range Status   06/22/2024 24.0 22.0 - 29.0 mmol/L Final   10/03/2022 26 22 - 29 mmol/L Final     Chloride   Date Value Ref Range Status   06/22/2024 101 98 - 107 mmol/L Final   10/03/2022 102 98 - 111 mmol/L Final     Anion Gap   Date Value Ref Range Status   06/22/2024 14.0 5.0 - 15.0 mmol/L Final   10/03/2022 14 7 - 19 mmol/L Final     Creatinine   Date Value Ref Range Status   06/22/2024 1.23 0.76 - 1.27 mg/dL Final   10/03/2022 0.9 0.5 - 1.2 mg/dL Final     BUN   Date Value Ref Range Status   06/22/2024 24 (H) 8 - 23 mg/dL Final   10/03/2022 20 8 - 23 mg/dL Final     BUN/Creatinine Ratio   Date Value Ref Range Status   06/22/2024 19.5 7.0 - 25.0 Final     Calcium   Date Value Ref Range Status   06/22/2024 9.5 8.6 - 10.5 mg/dL Final   10/03/2022 9.5 8.8 - 10.2 mg/dL Final     eGFR Non  Am   Date Value Ref Range Status   10/03/2022 >60 >60 Final     Comment:     This calculation may be inaccurate for patients under the age of 18 years.  For ages 18 and older, a GFR >60 mL/min/1.73m2 (not corrected for weight) is  valid for stable renal function.     Alkaline Phosphatase   Date Value Ref Range Status   06/22/2024 172  (H) 39 - 117 U/L Final   10/03/2022 95 40 - 130 U/L Final     Total Protein   Date Value Ref Range Status   06/22/2024 8.0 6.0 - 8.5 g/dL Final   10/03/2022 8.0 6.6 - 8.7 g/dL Final     ALT (SGPT)   Date Value Ref Range Status   06/22/2024 69 (H) 1 - 41 U/L Final   10/03/2022 60 (H) 5 - 41 U/L Final     AST (SGOT)   Date Value Ref Range Status   06/22/2024 38 1 - 40 U/L Final   10/03/2022 42 (H) 5 - 40 U/L Final     Total Bilirubin   Date Value Ref Range Status   06/22/2024 2.6 (H) 0.0 - 1.2 mg/dL Final   10/03/2022 2.1 (H) 0.2 - 1.2 mg/dL Final     Albumin   Date Value Ref Range Status   06/22/2024 4.5 3.5 - 5.2 g/dL Final   10/03/2022 4.6 3.5 - 5.2 g/dL Final     Globulin   Date Value Ref Range Status   06/22/2024 3.5 gm/dL Final        I reviewed the patient's clinical results and discussed with patient.    Results  I personally reviewed CT scan of the abdomen and pelvis that was performed on 6/22/2024 the following is my interpretation:  There is an expansile lesion of the right 10th rib this is compressing the liver, reviewed CT scan from 2022 and this is nearly doubled in size since then      Assessment & Plan     Assessment & Plan    Mr. Back is a 64-year-old male who presents with an expansile lesion from his right 10th rib.  This has been expanding since previous CT scans is compressing the liver and is causing him some pain.  I reviewed the imaging with our radiologist, Dr. Ascencio, and overall this appears to be a benign lesion given its containment without extension or significant surrounding inflammatory change.  I discussed the differential diagnosis with Mr. 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  Given his symptomatic nature of it and its compression of the liver though I do believe it is worth surgical excision to both confirm this pathology and to treat his symptoms.    We discussed today preoperative operative postoperative expectations of right 10th rib excision. We  discussed the risk and benefits of surgery and alternatives including but not limited to bleeding, infection, injury to major vessels or organs, need for transfusion, chronic pain, risk of anesthesia, and/or death.  He understands these risk and agrees to proceed.    We will schedule surgery soon.  Thank you for trusting me to the care of Mr. Back.  Please do not hesitate to call questions or concerns.        Mariano Licona MD   Cardiothoracic Surgeon      Patient or patient representative verbalized consent for the use of Ambient Listening during the visit with  Mariano Licona MD for chart documentation. 9/11/2024  09:07 CDT

## 2024-08-29 NOTE — LETTER
September 11, 2024       No Recipients    Patient: Issac Back   YOB: 1960   Date of Visit: 8/29/2024       Dear Anupam Thorne MD,    Issac Back was in my office today. Below are the relevant portions of my assessment and plan of care.    Mr. Back is a 64-year-old male who presents with an expansile lesion from his right 10th rib.  This has been expanding since previous CT scans is compressing the liver and is causing him some pain.  I reviewed the imaging with our radiologist, Dr. Ascencio, and overall this appears to be a benign lesion given its containment without extension or significant surrounding inflammatory change.  I discussed the differential diagnosis with Mr. 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  Given his symptomatic nature of it and its compression of the liver though I do believe it is worth surgical excision to both confirm this pathology and to treat his symptoms.    We discussed today preoperative operative postoperative expectations of right 10th rib excision. We discussed the risk and benefits of surgery and alternatives including but not limited to bleeding, infection, injury to major vessels or organs, need for transfusion, chronic pain, risk of anesthesia, and/or death.  He understands these risk and agrees to proceed.    We will schedule surgery soon.  Thank you for trusting me to the care of Mr. Back.  Please do not hesitate to call questions or concerns.         Sincerely,        Mariano Licona MD        CC:   No Recipients

## 2024-09-11 PROBLEM — R22.2 CHEST WALL MASS: Status: ACTIVE | Noted: 2024-09-11

## 2024-09-19 ENCOUNTER — TELEPHONE (OUTPATIENT)
Dept: CARDIAC SURGERY | Facility: CLINIC | Age: 64
End: 2024-09-19
Payer: MEDICARE

## 2024-09-19 ENCOUNTER — PREP FOR SURGERY (OUTPATIENT)
Dept: OTHER | Facility: HOSPITAL | Age: 64
End: 2024-09-19
Payer: MEDICARE

## 2024-09-19 DIAGNOSIS — M89.9 RIB LESION: Primary | ICD-10-CM

## 2024-09-19 DIAGNOSIS — R06.02 SOB (SHORTNESS OF BREATH): ICD-10-CM

## 2024-09-19 RX ORDER — SODIUM CHLORIDE 0.9 % (FLUSH) 0.9 %
10 SYRINGE (ML) INJECTION EVERY 12 HOURS SCHEDULED
OUTPATIENT
Start: 2024-09-19

## 2024-09-19 RX ORDER — SODIUM CHLORIDE 9 MG/ML
40 INJECTION, SOLUTION INTRAVENOUS AS NEEDED
OUTPATIENT
Start: 2024-09-19

## 2024-09-19 RX ORDER — SODIUM CHLORIDE 0.9 % (FLUSH) 0.9 %
10 SYRINGE (ML) INJECTION AS NEEDED
OUTPATIENT
Start: 2024-09-19

## 2024-09-19 RX ORDER — HEPARIN SODIUM 5000 [USP'U]/ML
5000 INJECTION, SOLUTION INTRAVENOUS; SUBCUTANEOUS ONCE
OUTPATIENT
Start: 2024-09-30

## 2024-09-27 ENCOUNTER — PRE-ADMISSION TESTING (OUTPATIENT)
Dept: PREADMISSION TESTING | Facility: HOSPITAL | Age: 64
End: 2024-09-27
Payer: MEDICARE

## 2024-09-27 ENCOUNTER — HOSPITAL ENCOUNTER (OUTPATIENT)
Dept: GENERAL RADIOLOGY | Facility: HOSPITAL | Age: 64
Discharge: HOME | End: 2024-09-27
Payer: MEDICARE

## 2024-09-27 VITALS
DIASTOLIC BLOOD PRESSURE: 80 MMHG | WEIGHT: 266.76 LBS | OXYGEN SATURATION: 98 % | SYSTOLIC BLOOD PRESSURE: 154 MMHG | HEIGHT: 71 IN | BODY MASS INDEX: 37.35 KG/M2 | RESPIRATION RATE: 18 BRPM | HEART RATE: 73 BPM

## 2024-09-27 DIAGNOSIS — R06.02 SOB (SHORTNESS OF BREATH): ICD-10-CM

## 2024-09-27 DIAGNOSIS — M89.9 RIB LESION: ICD-10-CM

## 2024-09-27 LAB
ABO GROUP BLD: NORMAL
ALBUMIN SERPL-MCNC: 4.5 G/DL (ref 3.5–5.2)
ALBUMIN/GLOB SERPL: 1.4 G/DL
ALP SERPL-CCNC: 201 U/L (ref 39–117)
ALT SERPL W P-5'-P-CCNC: 57 U/L (ref 1–41)
ANION GAP SERPL CALCULATED.3IONS-SCNC: 10 MMOL/L (ref 5–15)
APTT PPP: 30.7 SECONDS (ref 24.5–36)
AST SERPL-CCNC: 38 U/L (ref 1–40)
BASOPHILS # BLD AUTO: 0.04 10*3/MM3 (ref 0–0.2)
BASOPHILS NFR BLD AUTO: 0.4 % (ref 0–1.5)
BILIRUB SERPL-MCNC: 2 MG/DL (ref 0–1.2)
BLD GP AB SCN SERPL QL: NEGATIVE
BUN SERPL-MCNC: 22 MG/DL (ref 8–23)
BUN/CREAT SERPL: 24.7 (ref 7–25)
CALCIUM SPEC-SCNC: 9.8 MG/DL (ref 8.6–10.5)
CHLORIDE SERPL-SCNC: 103 MMOL/L (ref 98–107)
CO2 SERPL-SCNC: 29 MMOL/L (ref 22–29)
CREAT SERPL-MCNC: 0.89 MG/DL (ref 0.76–1.27)
DEPRECATED RDW RBC AUTO: 37.6 FL (ref 37–54)
EGFRCR SERPLBLD CKD-EPI 2021: 94.5 ML/MIN/1.73
EOSINOPHIL # BLD AUTO: 0.22 10*3/MM3 (ref 0–0.4)
EOSINOPHIL NFR BLD AUTO: 2.2 % (ref 0.3–6.2)
ERYTHROCYTE [DISTWIDTH] IN BLOOD BY AUTOMATED COUNT: 12.3 % (ref 12.3–15.4)
GLOBULIN UR ELPH-MCNC: 3.3 GM/DL
GLUCOSE SERPL-MCNC: 147 MG/DL (ref 65–99)
HCT VFR BLD AUTO: 44.1 % (ref 37.5–51)
HGB BLD-MCNC: 14.4 G/DL (ref 13–17.7)
IMM GRANULOCYTES # BLD AUTO: 0.03 10*3/MM3 (ref 0–0.05)
IMM GRANULOCYTES NFR BLD AUTO: 0.3 % (ref 0–0.5)
INR PPP: 1.02 (ref 0.91–1.09)
LYMPHOCYTES # BLD AUTO: 1.38 10*3/MM3 (ref 0.7–3.1)
LYMPHOCYTES NFR BLD AUTO: 13.8 % (ref 19.6–45.3)
MCH RBC QN AUTO: 27.4 PG (ref 26.6–33)
MCHC RBC AUTO-ENTMCNC: 32.7 G/DL (ref 31.5–35.7)
MCV RBC AUTO: 84 FL (ref 79–97)
MONOCYTES # BLD AUTO: 0.69 10*3/MM3 (ref 0.1–0.9)
MONOCYTES NFR BLD AUTO: 6.9 % (ref 5–12)
NEUTROPHILS NFR BLD AUTO: 7.65 10*3/MM3 (ref 1.7–7)
NEUTROPHILS NFR BLD AUTO: 76.4 % (ref 42.7–76)
NRBC BLD AUTO-RTO: 0 /100 WBC (ref 0–0.2)
PLATELET # BLD AUTO: 242 10*3/MM3 (ref 140–450)
PMV BLD AUTO: 11.1 FL (ref 6–12)
POTASSIUM SERPL-SCNC: 3.8 MMOL/L (ref 3.5–5.2)
PROT SERPL-MCNC: 7.8 G/DL (ref 6–8.5)
PROTHROMBIN TIME: 13.9 SECONDS (ref 11.8–14.8)
RBC # BLD AUTO: 5.25 10*6/MM3 (ref 4.14–5.8)
RH BLD: POSITIVE
SODIUM SERPL-SCNC: 142 MMOL/L (ref 136–145)
T&S EXPIRATION DATE: NORMAL
WBC NRBC COR # BLD AUTO: 10.01 10*3/MM3 (ref 3.4–10.8)

## 2024-09-27 PROCEDURE — 36415 COLL VENOUS BLD VENIPUNCTURE: CPT

## 2024-09-27 PROCEDURE — 85025 COMPLETE CBC W/AUTO DIFF WBC: CPT

## 2024-09-27 PROCEDURE — 85730 THROMBOPLASTIN TIME PARTIAL: CPT

## 2024-09-27 PROCEDURE — 85610 PROTHROMBIN TIME: CPT

## 2024-09-27 PROCEDURE — 71046 X-RAY EXAM CHEST 2 VIEWS: CPT

## 2024-09-27 PROCEDURE — 93010 ELECTROCARDIOGRAM REPORT: CPT | Performed by: INTERNAL MEDICINE

## 2024-09-27 PROCEDURE — 86850 RBC ANTIBODY SCREEN: CPT

## 2024-09-27 PROCEDURE — 86900 BLOOD TYPING SEROLOGIC ABO: CPT

## 2024-09-27 PROCEDURE — 86901 BLOOD TYPING SEROLOGIC RH(D): CPT

## 2024-09-27 PROCEDURE — 93005 ELECTROCARDIOGRAM TRACING: CPT

## 2024-09-27 PROCEDURE — 80053 COMPREHEN METABOLIC PANEL: CPT

## 2024-09-27 NOTE — DISCHARGE INSTRUCTIONS

## 2024-09-28 LAB
QT INTERVAL: 396 MS
QTC INTERVAL: 424 MS

## 2024-09-30 ENCOUNTER — ANESTHESIA EVENT (OUTPATIENT)
Dept: PERIOP | Facility: HOSPITAL | Age: 64
End: 2024-09-30
Payer: MEDICARE

## 2024-09-30 ENCOUNTER — HOSPITAL ENCOUNTER (OUTPATIENT)
Facility: HOSPITAL | Age: 64
Discharge: HOME | End: 2024-10-01
Attending: SURGERY | Admitting: SURGERY
Payer: MEDICARE

## 2024-09-30 ENCOUNTER — ANESTHESIA (OUTPATIENT)
Dept: PERIOP | Facility: HOSPITAL | Age: 64
End: 2024-09-30
Payer: MEDICARE

## 2024-09-30 ENCOUNTER — APPOINTMENT (OUTPATIENT)
Dept: GENERAL RADIOLOGY | Facility: HOSPITAL | Age: 64
End: 2024-09-30
Payer: MEDICARE

## 2024-09-30 DIAGNOSIS — M89.9 RIB LESION: ICD-10-CM

## 2024-09-30 DIAGNOSIS — R22.2 CHEST WALL MASS: Primary | ICD-10-CM

## 2024-09-30 LAB
ANION GAP SERPL CALCULATED.3IONS-SCNC: 9 MMOL/L (ref 5–15)
BUN SERPL-MCNC: 19 MG/DL (ref 8–23)
BUN/CREAT SERPL: 22.9 (ref 7–25)
CALCIUM SPEC-SCNC: 8.1 MG/DL (ref 8.6–10.5)
CHLORIDE SERPL-SCNC: 102 MMOL/L (ref 98–107)
CO2 SERPL-SCNC: 26 MMOL/L (ref 22–29)
CREAT SERPL-MCNC: 0.83 MG/DL (ref 0.76–1.27)
DEPRECATED RDW RBC AUTO: 37.9 FL (ref 37–54)
EGFRCR SERPLBLD CKD-EPI 2021: 96.5 ML/MIN/1.73
ERYTHROCYTE [DISTWIDTH] IN BLOOD BY AUTOMATED COUNT: 12.5 % (ref 12.3–15.4)
GLUCOSE SERPL-MCNC: 185 MG/DL (ref 65–99)
HCT VFR BLD AUTO: 40.6 % (ref 37.5–51)
HGB BLD-MCNC: 13.8 G/DL (ref 13–17.7)
MCH RBC QN AUTO: 28.5 PG (ref 26.6–33)
MCHC RBC AUTO-ENTMCNC: 34 G/DL (ref 31.5–35.7)
MCV RBC AUTO: 83.7 FL (ref 79–97)
PLATELET # BLD AUTO: 198 10*3/MM3 (ref 140–450)
PMV BLD AUTO: 10.9 FL (ref 6–12)
POTASSIUM SERPL-SCNC: 3.6 MMOL/L (ref 3.5–5.2)
RBC # BLD AUTO: 4.85 10*6/MM3 (ref 4.14–5.8)
SODIUM SERPL-SCNC: 137 MMOL/L (ref 136–145)
WBC NRBC COR # BLD AUTO: 9.86 10*3/MM3 (ref 3.4–10.8)

## 2024-09-30 PROCEDURE — 25010000002 SUGAMMADEX 200 MG/2ML SOLUTION

## 2024-09-30 PROCEDURE — 88321 CONSLTJ&REPRT SLD PREP ELSWR: CPT | Performed by: SURGERY

## 2024-09-30 PROCEDURE — 25010000002 CEFAZOLIN PER 500 MG: Performed by: SURGERY

## 2024-09-30 PROCEDURE — 25810000003 LACTATED RINGERS PER 1000 ML: Performed by: SURGERY

## 2024-09-30 PROCEDURE — 25010000002 PROPOFOL 10 MG/ML EMULSION

## 2024-09-30 PROCEDURE — 25010000002 ONDANSETRON PER 1 MG

## 2024-09-30 PROCEDURE — 88309 TISSUE EXAM BY PATHOLOGIST: CPT | Performed by: SURGERY

## 2024-09-30 PROCEDURE — 85027 COMPLETE CBC AUTOMATED: CPT | Performed by: SURGERY

## 2024-09-30 PROCEDURE — 21602 EXC CH WAL TUM W/O LYMPHADEC: CPT | Performed by: SURGERY

## 2024-09-30 PROCEDURE — 25010000002 CEFAZOLIN PER 500 MG: Performed by: NURSE PRACTITIONER

## 2024-09-30 PROCEDURE — 80048 BASIC METABOLIC PNL TOTAL CA: CPT | Performed by: SURGERY

## 2024-09-30 PROCEDURE — S0260 H&P FOR SURGERY: HCPCS | Performed by: SURGERY

## 2024-09-30 PROCEDURE — 88311 DECALCIFY TISSUE: CPT | Performed by: SURGERY

## 2024-09-30 PROCEDURE — 25010000002 HYDROMORPHONE PER 4 MG: Performed by: ANESTHESIOLOGY

## 2024-09-30 PROCEDURE — 71045 X-RAY EXAM CHEST 1 VIEW: CPT

## 2024-09-30 PROCEDURE — 25010000002 DEXAMETHASONE PER 1 MG

## 2024-09-30 PROCEDURE — 25010000002 FENTANYL CITRATE (PF) 250 MCG/5ML SOLUTION

## 2024-09-30 PROCEDURE — 25010000002 HEPARIN (PORCINE) PER 1000 UNITS: Performed by: NURSE PRACTITIONER

## 2024-09-30 PROCEDURE — C9290 INJ, BUPIVACAINE LIPOSOME: HCPCS | Performed by: SURGERY

## 2024-09-30 PROCEDURE — 25010000002 DROPERIDOL PER 5 MG: Performed by: ANESTHESIOLOGY

## 2024-09-30 PROCEDURE — 25010000002 FENTANYL CITRATE (PF) 50 MCG/ML SOLUTION: Performed by: ANESTHESIOLOGY

## 2024-09-30 PROCEDURE — 0 BUPIVACAINE LIPOSOME 1.3 % SUSPENSION 20 ML VIAL: Performed by: SURGERY

## 2024-09-30 PROCEDURE — C1729 CATH, DRAINAGE: HCPCS | Performed by: SURGERY

## 2024-09-30 RX ORDER — SODIUM CHLORIDE, SODIUM LACTATE, POTASSIUM CHLORIDE, CALCIUM CHLORIDE 600; 310; 30; 20 MG/100ML; MG/100ML; MG/100ML; MG/100ML
1000 INJECTION, SOLUTION INTRAVENOUS CONTINUOUS
Status: DISCONTINUED | OUTPATIENT
Start: 2024-09-30 | End: 2024-09-30

## 2024-09-30 RX ORDER — SODIUM CHLORIDE 0.9 % (FLUSH) 0.9 %
3 SYRINGE (ML) INJECTION EVERY 12 HOURS SCHEDULED
Status: DISCONTINUED | OUTPATIENT
Start: 2024-09-30 | End: 2024-09-30 | Stop reason: HOSPADM

## 2024-09-30 RX ORDER — ONDANSETRON 2 MG/ML
4 INJECTION INTRAMUSCULAR; INTRAVENOUS
Status: DISCONTINUED | OUTPATIENT
Start: 2024-09-30 | End: 2024-09-30 | Stop reason: HOSPADM

## 2024-09-30 RX ORDER — AMLODIPINE BESYLATE 5 MG/1
5 TABLET ORAL
Status: DISCONTINUED | OUTPATIENT
Start: 2024-09-30 | End: 2024-10-01 | Stop reason: HOSPADM

## 2024-09-30 RX ORDER — DOCUSATE SODIUM 100 MG/1
100 CAPSULE, LIQUID FILLED ORAL DAILY
Status: DISCONTINUED | OUTPATIENT
Start: 2024-09-30 | End: 2024-10-01 | Stop reason: HOSPADM

## 2024-09-30 RX ORDER — DROPERIDOL 2.5 MG/ML
0.62 INJECTION, SOLUTION INTRAMUSCULAR; INTRAVENOUS ONCE AS NEEDED
Status: COMPLETED | OUTPATIENT
Start: 2024-09-30 | End: 2024-09-30

## 2024-09-30 RX ORDER — HYDROMORPHONE HYDROCHLORIDE 1 MG/ML
0.5 INJECTION, SOLUTION INTRAMUSCULAR; INTRAVENOUS; SUBCUTANEOUS
Status: COMPLETED | OUTPATIENT
Start: 2024-09-30 | End: 2024-09-30

## 2024-09-30 RX ORDER — EPHEDRINE SULFATE 50 MG/ML
INJECTION INTRAVENOUS AS NEEDED
Status: DISCONTINUED | OUTPATIENT
Start: 2024-09-30 | End: 2024-09-30 | Stop reason: SURG

## 2024-09-30 RX ORDER — ONDANSETRON 2 MG/ML
INJECTION INTRAMUSCULAR; INTRAVENOUS AS NEEDED
Status: DISCONTINUED | OUTPATIENT
Start: 2024-09-30 | End: 2024-09-30 | Stop reason: SURG

## 2024-09-30 RX ORDER — POLYETHYLENE GLYCOL 3350 17 G/17G
17 POWDER, FOR SOLUTION ORAL DAILY
Status: DISCONTINUED | OUTPATIENT
Start: 2024-09-30 | End: 2024-10-01 | Stop reason: HOSPADM

## 2024-09-30 RX ORDER — SODIUM CHLORIDE 0.9 % (FLUSH) 0.9 %
3 SYRINGE (ML) INJECTION AS NEEDED
Status: DISCONTINUED | OUTPATIENT
Start: 2024-09-30 | End: 2024-09-30 | Stop reason: HOSPADM

## 2024-09-30 RX ORDER — BISACODYL 10 MG
10 SUPPOSITORY, RECTAL RECTAL DAILY PRN
Status: DISCONTINUED | OUTPATIENT
Start: 2024-09-30 | End: 2024-10-01 | Stop reason: HOSPADM

## 2024-09-30 RX ORDER — NALOXONE HCL 0.4 MG/ML
0.4 VIAL (ML) INJECTION
Status: DISCONTINUED | OUTPATIENT
Start: 2024-09-30 | End: 2024-10-01 | Stop reason: HOSPADM

## 2024-09-30 RX ORDER — MAGNESIUM HYDROXIDE 1200 MG/15ML
LIQUID ORAL AS NEEDED
Status: DISCONTINUED | OUTPATIENT
Start: 2024-09-30 | End: 2024-09-30 | Stop reason: HOSPADM

## 2024-09-30 RX ORDER — ROCURONIUM BROMIDE 10 MG/ML
INJECTION, SOLUTION INTRAVENOUS AS NEEDED
Status: DISCONTINUED | OUTPATIENT
Start: 2024-09-30 | End: 2024-09-30 | Stop reason: SURG

## 2024-09-30 RX ORDER — SODIUM CHLORIDE, SODIUM LACTATE, POTASSIUM CHLORIDE, CALCIUM CHLORIDE 600; 310; 30; 20 MG/100ML; MG/100ML; MG/100ML; MG/100ML
40 INJECTION, SOLUTION INTRAVENOUS CONTINUOUS
Status: DISCONTINUED | OUTPATIENT
Start: 2024-09-30 | End: 2024-10-01

## 2024-09-30 RX ORDER — SODIUM CHLORIDE 0.9 % (FLUSH) 0.9 %
10 SYRINGE (ML) INJECTION AS NEEDED
Status: DISCONTINUED | OUTPATIENT
Start: 2024-09-30 | End: 2024-09-30 | Stop reason: HOSPADM

## 2024-09-30 RX ORDER — HEPARIN SODIUM 5000 [USP'U]/ML
5000 INJECTION, SOLUTION INTRAVENOUS; SUBCUTANEOUS ONCE
Status: COMPLETED | OUTPATIENT
Start: 2024-09-30 | End: 2024-09-30

## 2024-09-30 RX ORDER — NALOXONE HCL 0.4 MG/ML
0.04 VIAL (ML) INJECTION AS NEEDED
Status: DISCONTINUED | OUTPATIENT
Start: 2024-09-30 | End: 2024-09-30 | Stop reason: HOSPADM

## 2024-09-30 RX ORDER — ACETAMINOPHEN 650 MG/1
650 SUPPOSITORY RECTAL EVERY 8 HOURS SCHEDULED
Status: DISCONTINUED | OUTPATIENT
Start: 2024-09-30 | End: 2024-10-01 | Stop reason: HOSPADM

## 2024-09-30 RX ORDER — SODIUM CHLORIDE 0.9 % (FLUSH) 0.9 %
3-10 SYRINGE (ML) INJECTION AS NEEDED
Status: DISCONTINUED | OUTPATIENT
Start: 2024-09-30 | End: 2024-09-30 | Stop reason: HOSPADM

## 2024-09-30 RX ORDER — LISINOPRIL 20 MG/1
20 TABLET ORAL
Status: DISCONTINUED | OUTPATIENT
Start: 2024-09-30 | End: 2024-10-01 | Stop reason: HOSPADM

## 2024-09-30 RX ORDER — DEXAMETHASONE SODIUM PHOSPHATE 4 MG/ML
INJECTION, SOLUTION INTRA-ARTICULAR; INTRALESIONAL; INTRAMUSCULAR; INTRAVENOUS; SOFT TISSUE AS NEEDED
Status: DISCONTINUED | OUTPATIENT
Start: 2024-09-30 | End: 2024-09-30 | Stop reason: SURG

## 2024-09-30 RX ORDER — SODIUM CHLORIDE 9 MG/ML
40 INJECTION, SOLUTION INTRAVENOUS AS NEEDED
Status: DISCONTINUED | OUTPATIENT
Start: 2024-09-30 | End: 2024-09-30 | Stop reason: HOSPADM

## 2024-09-30 RX ORDER — ONDANSETRON 2 MG/ML
4 INJECTION INTRAMUSCULAR; INTRAVENOUS EVERY 6 HOURS PRN
Status: DISCONTINUED | OUTPATIENT
Start: 2024-09-30 | End: 2024-10-01 | Stop reason: HOSPADM

## 2024-09-30 RX ORDER — MORPHINE SULFATE 2 MG/ML
2 INJECTION, SOLUTION INTRAMUSCULAR; INTRAVENOUS
Status: DISCONTINUED | OUTPATIENT
Start: 2024-09-30 | End: 2024-10-01 | Stop reason: HOSPADM

## 2024-09-30 RX ORDER — LABETALOL HYDROCHLORIDE 5 MG/ML
5 INJECTION, SOLUTION INTRAVENOUS
Status: DISCONTINUED | OUTPATIENT
Start: 2024-09-30 | End: 2024-09-30 | Stop reason: HOSPADM

## 2024-09-30 RX ORDER — IBUPROFEN 600 MG/1
600 TABLET, FILM COATED ORAL EVERY 6 HOURS PRN
Status: DISCONTINUED | OUTPATIENT
Start: 2024-09-30 | End: 2024-09-30 | Stop reason: HOSPADM

## 2024-09-30 RX ORDER — SODIUM CHLORIDE 0.9 % (FLUSH) 0.9 %
10 SYRINGE (ML) INJECTION EVERY 12 HOURS SCHEDULED
Status: DISCONTINUED | OUTPATIENT
Start: 2024-09-30 | End: 2024-09-30 | Stop reason: HOSPADM

## 2024-09-30 RX ORDER — ACETAMINOPHEN 500 MG
1000 TABLET ORAL ONCE
Status: COMPLETED | OUTPATIENT
Start: 2024-09-30 | End: 2024-09-30

## 2024-09-30 RX ORDER — FENTANYL CITRATE 50 UG/ML
INJECTION, SOLUTION INTRAMUSCULAR; INTRAVENOUS AS NEEDED
Status: DISCONTINUED | OUTPATIENT
Start: 2024-09-30 | End: 2024-09-30 | Stop reason: SURG

## 2024-09-30 RX ORDER — ONDANSETRON 4 MG/1
4 TABLET, ORALLY DISINTEGRATING ORAL EVERY 6 HOURS PRN
Status: DISCONTINUED | OUTPATIENT
Start: 2024-09-30 | End: 2024-10-01 | Stop reason: HOSPADM

## 2024-09-30 RX ORDER — LIDOCAINE HYDROCHLORIDE 20 MG/ML
INJECTION, SOLUTION EPIDURAL; INFILTRATION; INTRACAUDAL; PERINEURAL AS NEEDED
Status: DISCONTINUED | OUTPATIENT
Start: 2024-09-30 | End: 2024-09-30 | Stop reason: SURG

## 2024-09-30 RX ORDER — PROPOFOL 10 MG/ML
VIAL (ML) INTRAVENOUS AS NEEDED
Status: DISCONTINUED | OUTPATIENT
Start: 2024-09-30 | End: 2024-09-30 | Stop reason: SURG

## 2024-09-30 RX ORDER — FLUMAZENIL 0.1 MG/ML
0.2 INJECTION INTRAVENOUS AS NEEDED
Status: DISCONTINUED | OUTPATIENT
Start: 2024-09-30 | End: 2024-09-30 | Stop reason: HOSPADM

## 2024-09-30 RX ORDER — MIDAZOLAM HYDROCHLORIDE 2 MG/2ML
0.5 INJECTION, SOLUTION INTRAMUSCULAR; INTRAVENOUS
Status: DISCONTINUED | OUTPATIENT
Start: 2024-09-30 | End: 2024-09-30 | Stop reason: HOSPADM

## 2024-09-30 RX ORDER — ENOXAPARIN SODIUM 100 MG/ML
40 INJECTION SUBCUTANEOUS DAILY
Status: DISCONTINUED | OUTPATIENT
Start: 2024-10-01 | End: 2024-10-01 | Stop reason: HOSPADM

## 2024-09-30 RX ORDER — OXYCODONE AND ACETAMINOPHEN 10; 325 MG/1; MG/1
1 TABLET ORAL EVERY 4 HOURS PRN
Status: DISCONTINUED | OUTPATIENT
Start: 2024-09-30 | End: 2024-09-30 | Stop reason: HOSPADM

## 2024-09-30 RX ORDER — HYDROCODONE BITARTRATE AND ACETAMINOPHEN 5; 325 MG/1; MG/1
1 TABLET ORAL EVERY 6 HOURS PRN
Status: DISCONTINUED | OUTPATIENT
Start: 2024-09-30 | End: 2024-10-01 | Stop reason: HOSPADM

## 2024-09-30 RX ORDER — ACETAMINOPHEN 160 MG/5ML
650 SOLUTION ORAL EVERY 8 HOURS SCHEDULED
Status: DISCONTINUED | OUTPATIENT
Start: 2024-09-30 | End: 2024-10-01 | Stop reason: HOSPADM

## 2024-09-30 RX ORDER — FENTANYL CITRATE 50 UG/ML
50 INJECTION, SOLUTION INTRAMUSCULAR; INTRAVENOUS
Status: DISCONTINUED | OUTPATIENT
Start: 2024-09-30 | End: 2024-09-30 | Stop reason: HOSPADM

## 2024-09-30 RX ORDER — LIDOCAINE HYDROCHLORIDE 10 MG/ML
0.5 INJECTION, SOLUTION EPIDURAL; INFILTRATION; INTRACAUDAL; PERINEURAL ONCE AS NEEDED
Status: DISCONTINUED | OUTPATIENT
Start: 2024-09-30 | End: 2024-09-30 | Stop reason: HOSPADM

## 2024-09-30 RX ORDER — ACETAMINOPHEN 325 MG/1
650 TABLET ORAL EVERY 8 HOURS SCHEDULED
Status: DISCONTINUED | OUTPATIENT
Start: 2024-09-30 | End: 2024-10-01 | Stop reason: HOSPADM

## 2024-09-30 RX ORDER — SODIUM CHLORIDE, SODIUM LACTATE, POTASSIUM CHLORIDE, CALCIUM CHLORIDE 600; 310; 30; 20 MG/100ML; MG/100ML; MG/100ML; MG/100ML
100 INJECTION, SOLUTION INTRAVENOUS CONTINUOUS
Status: DISCONTINUED | OUTPATIENT
Start: 2024-09-30 | End: 2024-09-30

## 2024-09-30 RX ADMIN — HYDROMORPHONE HYDROCHLORIDE 0.5 MG: 1 INJECTION, SOLUTION INTRAMUSCULAR; INTRAVENOUS; SUBCUTANEOUS at 13:39

## 2024-09-30 RX ADMIN — FENTANYL CITRATE 50 MCG: 50 INJECTION, SOLUTION INTRAMUSCULAR; INTRAVENOUS at 10:50

## 2024-09-30 RX ADMIN — HYDROCODONE BITARTRATE AND ACETAMINOPHEN 1 TABLET: 5; 325 TABLET ORAL at 17:33

## 2024-09-30 RX ADMIN — HYDROMORPHONE HYDROCHLORIDE 0.5 MG: 1 INJECTION, SOLUTION INTRAMUSCULAR; INTRAVENOUS; SUBCUTANEOUS at 11:38

## 2024-09-30 RX ADMIN — PROPOFOL 200 MG: 10 INJECTION, EMULSION INTRAVENOUS at 08:30

## 2024-09-30 RX ADMIN — CEFAZOLIN 2 G: 2 INJECTION, POWDER, FOR SOLUTION INTRAMUSCULAR; INTRAVENOUS at 17:31

## 2024-09-30 RX ADMIN — SUGAMMADEX 200 MG: 100 INJECTION, SOLUTION INTRAVENOUS at 10:07

## 2024-09-30 RX ADMIN — FENTANYL CITRATE 50 MCG: 50 INJECTION, SOLUTION INTRAMUSCULAR; INTRAVENOUS at 08:34

## 2024-09-30 RX ADMIN — HEPARIN SODIUM 5000 UNITS: 5000 INJECTION, SOLUTION INTRAVENOUS; SUBCUTANEOUS at 07:39

## 2024-09-30 RX ADMIN — SODIUM CHLORIDE, POTASSIUM CHLORIDE, SODIUM LACTATE AND CALCIUM CHLORIDE 1000 ML: 600; 310; 30; 20 INJECTION, SOLUTION INTRAVENOUS at 06:57

## 2024-09-30 RX ADMIN — AMLODIPINE BESYLATE 5 MG: 5 TABLET ORAL at 17:33

## 2024-09-30 RX ADMIN — OXYCODONE AND ACETAMINOPHEN 1 TABLET: 325; 10 TABLET ORAL at 13:57

## 2024-09-30 RX ADMIN — DROPERIDOL 0.62 MG: 2.5 INJECTION, SOLUTION INTRAMUSCULAR; INTRAVENOUS at 10:39

## 2024-09-30 RX ADMIN — CEFAZOLIN 2000 MG: 2 INJECTION, POWDER, FOR SOLUTION INTRAMUSCULAR; INTRAVENOUS at 08:42

## 2024-09-30 RX ADMIN — ACETAMINOPHEN 1000 MG: 500 TABLET, FILM COATED ORAL at 07:39

## 2024-09-30 RX ADMIN — FENTANYL CITRATE 100 MCG: 50 INJECTION, SOLUTION INTRAMUSCULAR; INTRAVENOUS at 09:13

## 2024-09-30 RX ADMIN — SODIUM CHLORIDE, POTASSIUM CHLORIDE, SODIUM LACTATE AND CALCIUM CHLORIDE 40 ML/HR: 600; 310; 30; 20 INJECTION, SOLUTION INTRAVENOUS at 17:28

## 2024-09-30 RX ADMIN — SODIUM CHLORIDE, POTASSIUM CHLORIDE, SODIUM LACTATE AND CALCIUM CHLORIDE 1000 ML: 600; 310; 30; 20 INJECTION, SOLUTION INTRAVENOUS at 06:58

## 2024-09-30 RX ADMIN — ROCURONIUM 50 MG: 50 INJECTION, SOLUTION INTRAVENOUS at 08:31

## 2024-09-30 RX ADMIN — ACETAMINOPHEN 650 MG: 325 TABLET ORAL at 21:38

## 2024-09-30 RX ADMIN — DEXAMETHASONE SODIUM PHOSPHATE 4 MG: 4 INJECTION, SOLUTION INTRA-ARTICULAR; INTRALESIONAL; INTRAMUSCULAR; INTRAVENOUS; SOFT TISSUE at 08:38

## 2024-09-30 RX ADMIN — LIDOCAINE HYDROCHLORIDE 100 MG: 20 INJECTION, SOLUTION EPIDURAL; INFILTRATION; INTRACAUDAL; PERINEURAL at 08:30

## 2024-09-30 RX ADMIN — HYDROMORPHONE HYDROCHLORIDE 0.5 MG: 1 INJECTION, SOLUTION INTRAMUSCULAR; INTRAVENOUS; SUBCUTANEOUS at 10:34

## 2024-09-30 RX ADMIN — CEFAZOLIN 1000 MG: 2 INJECTION, POWDER, FOR SOLUTION INTRAMUSCULAR; INTRAVENOUS at 08:49

## 2024-09-30 RX ADMIN — FENTANYL CITRATE 100 MCG: 50 INJECTION, SOLUTION INTRAMUSCULAR; INTRAVENOUS at 09:03

## 2024-09-30 RX ADMIN — HYDROMORPHONE HYDROCHLORIDE 0.5 MG: 1 INJECTION, SOLUTION INTRAMUSCULAR; INTRAVENOUS; SUBCUTANEOUS at 13:50

## 2024-09-30 RX ADMIN — LISINOPRIL 20 MG: 20 TABLET ORAL at 17:33

## 2024-09-30 RX ADMIN — ONDANSETRON 4 MG: 2 INJECTION INTRAMUSCULAR; INTRAVENOUS at 10:07

## 2024-09-30 RX ADMIN — EPHEDRINE SULFATE 10 MG: 50 INJECTION INTRAVENOUS at 09:05

## 2024-09-30 NOTE — PLAN OF CARE
Goal Outcome Evaluation:  Plan of Care Reviewed With: patient        Progress: no change  Outcome Evaluation: Pt post-op; right CT in place -20cm dry suction; voiding per urinal; IVF and IV abx initiated; tolerating diet; PRN pain meds given x1 since arrival to floor; safety maintained.

## 2024-09-30 NOTE — ANESTHESIA POSTPROCEDURE EVALUATION
"Patient: Issac Back    Procedure Summary       Date: 09/30/24 Room / Location:  [facility] OR 16 /  [facility] OR    Anesthesia Start: 0826 Anesthesia Stop: 1025    Procedure: RIGHT 10th RIB EXCISION (Right: Chest) Diagnosis:       Rib lesion      (Rib lesion [M89.9])    Surgeons: Mariano Licona MD Provider: Giancarlo Cadet CRNA    Anesthesia Type: general ASA Status: 2            Anesthesia Type: general    Vitals  Vitals Value Taken Time   /83 09/30/24 1140   Temp 97.2 °F (36.2 °C) 09/30/24 1027   Pulse 56 09/30/24 1146   Resp 14 09/30/24 1140   SpO2 91 % 09/30/24 1146   Vitals shown include unfiled device data.        Post Anesthesia Care and Evaluation    Patient location during evaluation: PACU  Patient participation: complete - patient participated  Level of consciousness: awake and awake and alert  Pain score: 0  Pain management: adequate    Airway patency: patent  Anesthetic complications: No anesthetic complications  PONV Status: none  Cardiovascular status: acceptable  Respiratory status: acceptable  Hydration status: acceptable    Comments: Patient discharged according to acceptable Az[place] score per RN assessment. See nursing records for further information.     Blood pressure 153/83, pulse 60, temperature 97.2 °F (36.2 °C), temperature source Temporal, resp. rate 14, height 181 cm (71.26\"), weight 119 kg (261 lb 14.5 oz), SpO2 90%.      "

## 2024-09-30 NOTE — ANESTHESIA PREPROCEDURE EVALUATION
Anesthesia Evaluation     Patient summary reviewed   no history of anesthetic complications:   NPO Solid Status: > 8 hours             Airway   Mallampati: II  TM distance: >3 FB  Neck ROM: full  Dental      Pulmonary - negative pulmonary ROS   Cardiovascular   Exercise tolerance: excellent (>7 METS)    (+) hypertension  (-) cardiac stents, CABG      Neuro/Psych- negative ROS  GI/Hepatic/Renal/Endo    (+) obesity    Musculoskeletal     Abdominal    Substance History      OB/GYN          Other                          Anesthesia Plan    ASA 2     general     (expansile lesion from his right 10th-->compressing the liver)  intravenous induction     Anesthetic plan, risks, benefits, and alternatives have been provided, discussed and informed consent has been obtained with: patient.

## 2024-09-30 NOTE — OP NOTE
Cardiothoracic Surgery Operative Note    Date of Procedure: 9/30/2024    Pre-op diagnosis: Rib Mass, Right 10th Rib  Hypertension  Obese, BMI 36.3    Post-op diagnosis: Same     Procedure:  Excision of Rib, Right 10th Rib  Resection of and Reconstruction of Diaphragm and Chest Wall     Surgeon: Mariano Licona M.D.  Assistant: Ling Romano CFA  Anesthesia: Gerenal  EBL: 30 mL  Drains: 24 Macedonian Straight Right Pleural Tube  Specimens:  Right Rib Mass     Operative indications:    Mr. Back is a 64-year-old male who presented to me with an expansile right 10th rib mass.  This measured over 5 cm in size and then doubled in size since 2022.  We reviewed with radiology and likely to be benign lesion but cannot exclude malignancy, he is also having some symptoms with pain at the local site.  With this discussion the risk and benefits of proceeding with excision.  He understood and agreed to proceed.    Operative findings  Firm right rib mass, attachments to the chest wall musculature as well as the diaphragm, small portion of the diaphragm had to be resected for full resection  Tension-free diaphragmatic defect closure  Primary chest wall closure  24 Macedonian straight chest tube placed to the right pleural space.     Operative description in detail:  The patient was taken to the operative suite where he was placed in a supine position.  General anesthesia was induced without complication. A timeout was performed.  Patient was placed in the lateral decubitus position with the right side up.  Patient was then prepped and draped in the usual and sterile fashion.      Incision made over the mass which was palpable.  Dissection carried down through skin and soft tissues and through the lateral oblique musculature.  Mass encountered and it was circumferentially dissected.  The intercostal artery and nerve bundle was clipped in the proximal portion.  I first divided the rib distal to approximately 1 cm from the mass.  The mass  was adherent to the underlying diaphragm.  I preserve as much diaphragmatic tissue as possible but did require small diaphragm resection.  I then was able to identify the proximal bone that appeared normal.  This was divided and then the arterial and nerve bundles were clipped.  With this I was easily able to remove the entire mass.  It was sent for pathology.    I then proceeded reconstruction of the diaphragmatic defect.  The peritoneum was intact, the diaphragm easily reached over to the chest wall.  The diaphragm was sutured with a running 2-0 Prolene suture to the diaphragm ruminant and in some areas to the chest wall musculature.  This allowed excellent closure of the diaphragmatic defect.  I then placed a 24 Lao straight pleural tube in standard fashion and secured with suture.  I then closed the deep oblique layer of muscle using figure-of-eight 0 Vicryl sutures.  There was some tension on this but was able to close with sequential suturing.  I then closed the oblique abdominal wall musculature with a running 0 Vicryl suture.  Exparel was placed.  Skin and soft tissue was closed with absorbable suture in anatomic layers.  Dressing was placed.  Patient was extubated the operating room transferred to the PACU in stable condition.  All needle sponge instrument counts were reported as correct at the end of the case.     Complications: None  Disposition: Transfer to PACU extubated and in stable condition.      Mariano Licona MD  Cardiothoracic Surgeon

## 2024-09-30 NOTE — ANESTHESIA PROCEDURE NOTES
Airway  Urgency: elective    Date/Time: 9/30/2024 8:34 AM  Airway not difficult    General Information and Staff    Patient location during procedure: OR  CRNA/CAA: Syed Mehta CRNA    Indications and Patient Condition    Preoxygenated: yes  Mask difficulty assessment: 1 - vent by mask    Final Airway Details  Final airway type: endotracheal airway      Successful airway: ETT  Cuffed: yes   Successful intubation technique: video laryngoscopy  Endotracheal tube insertion site: oral  Blade: Iglesias  Blade size: 3  ETT size (mm): 7.5  Cormack-Lehane Classification: grade I - full view of glottis  Placement verified by: chest auscultation   Cuff volume (mL): 8  Measured from: lips  ETT/EBT  to lips (cm): 23  Number of attempts at approach: 2  Assessment: lips, teeth, and gum same as pre-op and atraumatic intubation    Additional Comments  First attempt to obtain view with duncan 2 , likely due to positioning

## 2024-09-30 NOTE — H&P
No significant change since H&P below.  Discussed again the operative plan and risks and benefits, he understands and agrees to proceed.    Mariano Licona M.D.  Cardiothoracic Surgeon    Thoracic Surgery Consultation     Referring Physician: Dr. Anupam Thorne     Primary Care Physician: Dr. Anupam Thorne          Chief Complaint   Patient presents with    Rib Lesion       New patient referred from ROBERTO Leavitt               Subjective  History of Present Illness  The patient presents for evaluation of a mass on his 10th rib.     The mass was initially discovered through a CT scan, although he did not notice it himself. He can feel the mass upon touch, but it does not cause any discomfort. The radiologist's report confirmed the presence of the mass. He notes that the mass has been increasing in size recently.     He has no history of cancer and is generally in good health. He reports no cardiac or pulmonary issues and has never undergone surgery on his heart, lungs, or chest. The only surgical procedure he has had was a cholecystectomy in 2022, during which the mass was first noticed. He experienced some pain after this surgery, which he reported to Dr. Jewell. He was informed that the discomfort might be due to the way he was lifted from the operating table.     He is not diabetic but is considered borderline prediabetic. He does not take any blood thinners.     SOCIAL HISTORY  He does not smoke anymore.        Review of Systems      A complete review of systems was performed, is negative except stated above.     Medical History        Past Medical History:   Diagnosis Date    History of colon polyps      Hypertension           Surgical History         Past Surgical History:   Procedure Laterality Date    COLONOSCOPY   10/12/2015     One 6mm hyperplastic polyp in the rectum; Repeat 5 years    COLONOSCOPY N/A 1/27/2021     Procedure: COLONOSCOPY WITH ANESTHESIA;  Surgeon: Melinda Burns MD;  Location[facility]  "ENDOSCOPY;  Service: Gastroenterology;  Laterality: N/A;  pre op: hx polyps  post op: int hemmhroids   PCP: Anupam Quintana MD    VASECTOMY                   Family History   Problem Relation Age of Onset    Brain cancer Father      No Known Problems Mother      Colon cancer Neg Hx      Colon polyps Neg Hx      Esophageal cancer Neg Hx      Liver cancer Neg Hx      Liver disease Neg Hx      Rectal cancer Neg Hx      Stomach cancer Neg Hx        Social History   Social History            Tobacco Use    Smoking status: Former       Current packs/day: 0.00       Average packs/day: 1 pack/day for 14.0 years (14.0 ttl pk-yrs)       Types: Cigarettes       Start date:        Quit date:        Years since quittin.6       Passive exposure: Past    Smokeless tobacco: Never   Vaping Use    Vaping status: Never Used   Substance Use Topics    Alcohol use: Not Currently    Drug use: Never         Current Medications          Current Outpatient Medications   Medication Sig Dispense Refill    amLODIPine-benazepril (LOTREL 5-20) 5-20 MG per capsule Take 1 capsule by mouth Daily.        IBUPROFEN PO Take  by mouth As Needed. OTC          No current facility-administered medications for this visit.         Allergies:  Patient has no known allergies.           Objective  Vital Signs  Visit Vitals  /84 (BP Location: Right arm, Patient Position: Sitting, Cuff Size: Adult)   Pulse 64   Ht 180.3 cm (71\")   Wt 120 kg (264 lb)   SpO2 96%   BMI 36.82 kg/m²            Physical Exam  Constitutional:       General: He is not in acute distress.     Appearance: He is well-developed. He is obese. He is not diaphoretic.   HENT:      Head: Normocephalic and atraumatic.      Right Ear: External ear normal.      Left Ear: External ear normal.   Eyes:      General:         Right eye: No discharge.         Left eye: No discharge.      Pupils: Pupils are equal, round, and reactive to light.   Neck:      Vascular: No JVD.      " Trachea: No tracheal deviation.   Cardiovascular:      Rate and Rhythm: Normal rate and regular rhythm.      Heart sounds: Normal heart sounds. No murmur heard.  Pulmonary:      Effort: Pulmonary effort is normal. No respiratory distress.      Breath sounds: Normal breath sounds. No stridor. No wheezing.      Comments: Palpable mass along the lower lateral chest wall consistent with imaging findings  Abdominal:      General: There is no distension.      Palpations: Abdomen is soft.      Tenderness: There is no abdominal tenderness. There is no guarding.   Musculoskeletal:         General: No tenderness or deformity. Normal range of motion.      Cervical back: Normal range of motion and neck supple.   Skin:     General: Skin is warm and dry.      Capillary Refill: Capillary refill takes less than 2 seconds.      Coloration: Skin is not pale.      Findings: No erythema or rash.   Neurological:      Mental Status: He is alert and oriented to person, place, and time.      Motor: No abnormal muscle tone.      Coordination: Coordination normal.   Psychiatric:         Behavior: Behavior normal.         Thought Content: Thought content normal.         Judgment: Judgment normal.         Physical Exam        Results Review:            WBC   Date Value Ref Range Status   06/22/2024 15.98 (H) 3.40 - 10.80 10*3/mm3 Final   10/03/2022 18.4 (H) 4.8 - 10.8 K/uL Final            RBC   Date Value Ref Range Status   06/22/2024 5.41 4.14 - 5.80 10*6/mm3 Final   10/03/2022 6.04 4.70 - 6.10 M/uL Final            Hemoglobin   Date Value Ref Range Status   06/22/2024 15.4 13.0 - 17.7 g/dL Final   10/03/2022 16.9 14.0 - 18.0 g/dL Final            Hematocrit   Date Value Ref Range Status   06/22/2024 44.1 37.5 - 51.0 % Final   10/03/2022 51.1 42.0 - 52.0 % Final            MCV   Date Value Ref Range Status   06/22/2024 81.5 79.0 - 97.0 fL Final   10/03/2022 84.6 80.0 - 94.0 fL Final            MCH   Date Value Ref Range Status   06/22/2024  28.5 26.6 - 33.0 pg Final   10/03/2022 28.0 27.0 - 31.0 pg Final            MCHC   Date Value Ref Range Status   06/22/2024 34.9 31.5 - 35.7 g/dL Final   10/03/2022 33.1 33.0 - 37.0 g/dL Final            RDW   Date Value Ref Range Status   06/22/2024 12.3 12.3 - 15.4 % Final   10/03/2022 12.5 11.5 - 14.5 % Final            RDW-SD   Date Value Ref Range Status   06/22/2024 36.3 (L) 37.0 - 54.0 fl Final            MPV   Date Value Ref Range Status   06/22/2024 10.9 6.0 - 12.0 fL Final   10/03/2022 10.6 9.4 - 12.4 fL Final            Platelets   Date Value Ref Range Status   06/22/2024 265 140 - 450 10*3/mm3 Final   10/03/2022 263 130 - 400 K/uL Final            Neutrophil Rel %   Date Value Ref Range Status   10/03/2022 85.0 (H) 50.0 - 65.0 % Final            Neutrophil %   Date Value Ref Range Status   06/22/2024 89.9 (H) 42.7 - 76.0 % Final            Lymphocyte Rel %   Date Value Ref Range Status   10/03/2022 8.5 (L) 20.0 - 40.0 % Final            Lymphocyte %   Date Value Ref Range Status   06/22/2024 6.1 (L) 19.6 - 45.3 % Final            Monocyte Rel %   Date Value Ref Range Status   10/03/2022 4.8 0.0 - 10.0 % Final            Monocyte %   Date Value Ref Range Status   06/22/2024 3.3 (L) 5.0 - 12.0 % Final            Eosinophil Rel %   Date Value Ref Range Status   10/03/2022 0.9 0.0 - 5.0 % Final            Eosinophil %   Date Value Ref Range Status   06/22/2024 0.1 (L) 0.3 - 6.2 % Final            Basophil Rel %   Date Value Ref Range Status   10/03/2022 0.3 0.0 - 1.0 % Final            Basophil %   Date Value Ref Range Status   06/22/2024 0.2 0.0 - 1.5 % Final            Immature Grans %   Date Value Ref Range Status   06/22/2024 0.4 0.0 - 0.5 % Final            Neutrophils Absolute   Date Value Ref Range Status   10/03/2022 15.7 (H) 1.5 - 7.5 K/uL Final            Neutrophils, Absolute   Date Value Ref Range Status   06/22/2024 14.36 (H) 1.70 - 7.00 10*3/mm3 Final            Lymphocytes Absolute   Date  Value Ref Range Status   10/03/2022 1.6 1.1 - 4.5 K/uL Final            Lymphocytes, Absolute   Date Value Ref Range Status   06/22/2024 0.98 0.70 - 3.10 10*3/mm3 Final            Monocytes Absolute   Date Value Ref Range Status   10/03/2022 0.90 0.00 - 0.90 K/uL Final            Monocytes, Absolute   Date Value Ref Range Status   06/22/2024 0.53 0.10 - 0.90 10*3/mm3 Final            Eosinophils Absolute   Date Value Ref Range Status   10/03/2022 0.20 0.00 - 0.60 K/uL Final            Eosinophils, Absolute   Date Value Ref Range Status   06/22/2024 0.01 0.00 - 0.40 10*3/mm3 Final            Basophils Absolute   Date Value Ref Range Status   10/03/2022 0.10 0.00 - 0.20 K/uL Final            Basophils, Absolute   Date Value Ref Range Status   06/22/2024 0.03 0.00 - 0.20 10*3/mm3 Final            Immature Grans, Absolute   Date Value Ref Range Status   06/22/2024 0.07 (H) 0.00 - 0.05 10*3/mm3 Final   10/03/2022 0.1 K/uL Final            nRBC   Date Value Ref Range Status   06/22/2024 0.0 0.0 - 0.2 /100 WBC Final            Glucose   Date Value Ref Range Status   06/22/2024 175 (H) 65 - 99 mg/dL Final   10/03/2022 140 (H) 74 - 109 mg/dL Final            Sodium   Date Value Ref Range Status   06/22/2024 139 136 - 145 mmol/L Final   10/03/2022 142 136 - 145 mmol/L Final            Potassium   Date Value Ref Range Status   06/22/2024 3.7 3.5 - 5.2 mmol/L Final   10/03/2022 3.9 3.5 - 5.0 mmol/L Final            CO2   Date Value Ref Range Status   06/22/2024 24.0 22.0 - 29.0 mmol/L Final   10/03/2022 26 22 - 29 mmol/L Final            Chloride   Date Value Ref Range Status   06/22/2024 101 98 - 107 mmol/L Final   10/03/2022 102 98 - 111 mmol/L Final            Anion Gap   Date Value Ref Range Status   06/22/2024 14.0 5.0 - 15.0 mmol/L Final   10/03/2022 14 7 - 19 mmol/L Final            Creatinine   Date Value Ref Range Status   06/22/2024 1.23 0.76 - 1.27 mg/dL Final   10/03/2022 0.9 0.5 - 1.2 mg/dL Final            BUN    Date Value Ref Range Status   06/22/2024 24 (H) 8 - 23 mg/dL Final   10/03/2022 20 8 - 23 mg/dL Final            BUN/Creatinine Ratio   Date Value Ref Range Status   06/22/2024 19.5 7.0 - 25.0 Final            Calcium   Date Value Ref Range Status   06/22/2024 9.5 8.6 - 10.5 mg/dL Final   10/03/2022 9.5 8.8 - 10.2 mg/dL Final              eGFR Non  Am   Date Value Ref Range Status   10/03/2022 >60 >60 Final       Comment:       This calculation may be inaccurate for patients under the age of 18 years.  For ages 18 and older, a GFR >60 mL/min/1.73m2 (not corrected for weight) is  valid for stable renal function.            Alkaline Phosphatase   Date Value Ref Range Status   06/22/2024 172 (H) 39 - 117 U/L Final   10/03/2022 95 40 - 130 U/L Final            Total Protein   Date Value Ref Range Status   06/22/2024 8.0 6.0 - 8.5 g/dL Final   10/03/2022 8.0 6.6 - 8.7 g/dL Final            ALT (SGPT)   Date Value Ref Range Status   06/22/2024 69 (H) 1 - 41 U/L Final   10/03/2022 60 (H) 5 - 41 U/L Final            AST (SGOT)   Date Value Ref Range Status   06/22/2024 38 1 - 40 U/L Final   10/03/2022 42 (H) 5 - 40 U/L Final            Total Bilirubin   Date Value Ref Range Status   06/22/2024 2.6 (H) 0.0 - 1.2 mg/dL Final   10/03/2022 2.1 (H) 0.2 - 1.2 mg/dL Final            Albumin   Date Value Ref Range Status   06/22/2024 4.5 3.5 - 5.2 g/dL Final   10/03/2022 4.6 3.5 - 5.2 g/dL Final            Globulin   Date Value Ref Range Status   06/22/2024 3.5 gm/dL Final                     I reviewed the patient's clinical results and discussed with patient.     Results  I personally reviewed CT scan of the abdomen and pelvis that was performed on 6/22/2024 the following is my interpretation:  There is an expansile lesion of the right 10th rib this is compressing the liver, reviewed CT scan from 2022 and this is nearly doubled in size since then              Assessment & Plan  Assessment & Plan     Mr. Back is a  64-year-old male who presents with an expansile lesion from his right 10th rib.  This has been expanding since previous CT scans is compressing the liver and is causing him some pain.  I reviewed the imaging with our radiologist, Dr. Ascencio, and overall this appears to be a benign lesion given its containment without extension or significant surrounding inflammatory change.  I discussed the differential diagnosis with Mr. 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  Given his symptomatic nature of it and its compression of the liver though I do believe it is worth surgical excision to both confirm this pathology and to treat his symptoms.     We discussed today preoperative operative postoperative expectations of right 10th rib excision. We discussed the risk and benefits of surgery and alternatives including but not limited to bleeding, infection, injury to major vessels or organs, need for transfusion, chronic pain, risk of anesthesia, and/or death.  He understands these risk and agrees to proceed.     We will schedule surgery soon.  Thank you for trusting me to the care of Mr. Back.  Please do not hesitate to call questions or concerns.           Mariano Licona MD   Cardiothoracic Surgeon

## 2024-10-01 ENCOUNTER — APPOINTMENT (OUTPATIENT)
Dept: GENERAL RADIOLOGY | Facility: HOSPITAL | Age: 64
End: 2024-10-01
Payer: MEDICARE

## 2024-10-01 VITALS
DIASTOLIC BLOOD PRESSURE: 75 MMHG | HEIGHT: 71 IN | WEIGHT: 261.91 LBS | HEART RATE: 71 BPM | OXYGEN SATURATION: 94 % | TEMPERATURE: 98.4 F | SYSTOLIC BLOOD PRESSURE: 155 MMHG | BODY MASS INDEX: 36.67 KG/M2 | RESPIRATION RATE: 16 BRPM

## 2024-10-01 LAB
ANION GAP SERPL CALCULATED.3IONS-SCNC: 13 MMOL/L (ref 5–15)
BUN SERPL-MCNC: 18 MG/DL (ref 8–23)
BUN/CREAT SERPL: 22.2 (ref 7–25)
CALCIUM SPEC-SCNC: 8.4 MG/DL (ref 8.6–10.5)
CHLORIDE SERPL-SCNC: 100 MMOL/L (ref 98–107)
CO2 SERPL-SCNC: 25 MMOL/L (ref 22–29)
CREAT SERPL-MCNC: 0.81 MG/DL (ref 0.76–1.27)
DEPRECATED RDW RBC AUTO: 39.2 FL (ref 37–54)
EGFRCR SERPLBLD CKD-EPI 2021: 97.2 ML/MIN/1.73
ERYTHROCYTE [DISTWIDTH] IN BLOOD BY AUTOMATED COUNT: 12.9 % (ref 12.3–15.4)
GLUCOSE SERPL-MCNC: 138 MG/DL (ref 65–99)
HCT VFR BLD AUTO: 42 % (ref 37.5–51)
HGB BLD-MCNC: 13.9 G/DL (ref 13–17.7)
MCH RBC QN AUTO: 28.1 PG (ref 26.6–33)
MCHC RBC AUTO-ENTMCNC: 33.1 G/DL (ref 31.5–35.7)
MCV RBC AUTO: 85 FL (ref 79–97)
PLATELET # BLD AUTO: 222 10*3/MM3 (ref 140–450)
PMV BLD AUTO: 11.8 FL (ref 6–12)
POTASSIUM SERPL-SCNC: 3.4 MMOL/L (ref 3.5–5.2)
RBC # BLD AUTO: 4.94 10*6/MM3 (ref 4.14–5.8)
SODIUM SERPL-SCNC: 138 MMOL/L (ref 136–145)
WBC NRBC COR # BLD AUTO: 12.94 10*3/MM3 (ref 3.4–10.8)

## 2024-10-01 PROCEDURE — 80048 BASIC METABOLIC PNL TOTAL CA: CPT | Performed by: SURGERY

## 2024-10-01 PROCEDURE — 85027 COMPLETE CBC AUTOMATED: CPT | Performed by: SURGERY

## 2024-10-01 PROCEDURE — 25010000002 ENOXAPARIN PER 10 MG: Performed by: SURGERY

## 2024-10-01 PROCEDURE — 71046 X-RAY EXAM CHEST 2 VIEWS: CPT

## 2024-10-01 PROCEDURE — 25010000002 CEFAZOLIN PER 500 MG: Performed by: SURGERY

## 2024-10-01 PROCEDURE — 71045 X-RAY EXAM CHEST 1 VIEW: CPT

## 2024-10-01 RX ORDER — HYDROCODONE BITARTRATE AND ACETAMINOPHEN 5; 325 MG/1; MG/1
1 TABLET ORAL EVERY 6 HOURS PRN
Qty: 25 TABLET | Refills: 0 | Status: SHIPPED | OUTPATIENT
Start: 2024-10-01

## 2024-10-01 RX ADMIN — ACETAMINOPHEN 650 MG: 325 TABLET ORAL at 14:38

## 2024-10-01 RX ADMIN — HYDROCODONE BITARTRATE AND ACETAMINOPHEN 1 TABLET: 5; 325 TABLET ORAL at 10:33

## 2024-10-01 RX ADMIN — AMLODIPINE BESYLATE 5 MG: 5 TABLET ORAL at 08:38

## 2024-10-01 RX ADMIN — CEFAZOLIN 2 G: 2 INJECTION, POWDER, FOR SOLUTION INTRAMUSCULAR; INTRAVENOUS at 02:15

## 2024-10-01 RX ADMIN — ACETAMINOPHEN 650 MG: 325 TABLET ORAL at 05:29

## 2024-10-01 RX ADMIN — HYDROCODONE BITARTRATE AND ACETAMINOPHEN 1 TABLET: 5; 325 TABLET ORAL at 03:20

## 2024-10-01 RX ADMIN — DOCUSATE SODIUM 100 MG: 100 CAPSULE, LIQUID FILLED ORAL at 08:38

## 2024-10-01 RX ADMIN — LISINOPRIL 20 MG: 20 TABLET ORAL at 08:38

## 2024-10-01 RX ADMIN — ENOXAPARIN SODIUM 40 MG: 100 INJECTION SUBCUTANEOUS at 08:38

## 2024-10-01 NOTE — DISCHARGE SUMMARY
[facility] Cardiothoracic Surgery  DISCHARGE SUMMARY        Date of Admission: 9/30/2024  Date of Discharge:  10/1/2024  Primary Care Physician: Anupam Thorne MD    Discharge Diagnoses:  Active Hospital Problems    Diagnosis     **Rib lesion        Procedures Performed:   Excision of Rib, Right 10th Rib, Resection of and Reconstruction of Diaphragm and Chest Wall by Dr. Licona on 9/30/2024    HPI:  Mr. Issac Back is a 64 y.o. male who presented to Dr. Licona as an outpatient with a right 10th rib mass.  This measured over 5 cm in size and had increased in size since 2022.  This was reviewed with radiology and likely benign however malignancy cannot be excluded.  He did also report having some symptoms with pain at the local site.  Dr. Licona discussed with him the risk and benefits of proceeding with excision, he understood and agreed to proceed.    Hospital Course: On 9/30/2024, Mr. Back was taken to the operating room for excision of right rib and resection and reconstruction of diaphragm and chest wall.  See op note by Dr. Licona detailing the operation.  Following surgery he was transferred to the PACU in stable condition and ultimately transferred to  for ongoing recovery.  The right chest tube was removed without remark on the morning of postop day 1.  Follow-up imaging revealed stability with no pneumothorax.  He is eating well.  Pain is well-controlled.  He is tolerating room air.  He meets criteria for discharge home on postop day 1 and patient is agreeable to this plan.    Final pathology is pending at the time of discharge and will be discussed at his follow-up visit.  See me in 2 weeks with chest x-ray and Dr. Licona in 6 weeks with CT chest without contrast.    Condition on Discharge:  Neurologically intact and has good pain control.  He is eating well. All thoracic incisions are healing nicely without evidence of thoracic hernia.  The heart is in normal sinus rhythm.          Discharge Disposition:  Home or Self Care [1]    Discharge Medications:     Discharge Medications        New Medications        Instructions Start Date   HYDROcodone-acetaminophen 5-325 MG per tablet  Commonly known as: NORCO   1 tablet, Oral, Every 6 Hours PRN             Continue These Medications        Instructions Start Date   amLODIPine-benazepril 5-20 MG per capsule  Commonly known as: LOTREL 5-20   1 capsule, Oral, Daily      IBUPROFEN PO   400 mg, Oral, As Needed, OTC               Discharge Diet: Regular diet     Discharge Care Plan / Instructions:   Keep incisions clean and open to air.  May wash with soap and water.  Chest tube sites with dressings to keep on for 48 hours.  Ok to reapply dressing as needed after removal.      Activity at Discharge:   No heavy lifting greater than 5 pounds or a gallon of milk and refrain from driving until cleared.      Tobacco: The patient does not use tobacco products and therefore does not need tobacco cessation education.    BMI: Class 2 Severe Obesity (BMI >=35 and <=39.9). Obesity-related health conditions include the following: hypertension. Obesity is unchanged. BMI is is above average; BMI management plan is completed. We discussed portion control and increasing exercise.      Follow-up Appointments: Issac Back  is requested to see Anupam Thorne MD within 1-2 weeks from time of discharge, to follow-up with ROLAND Menard in 2 weeks with chest x-ray, follow-up with Dr. Licona in 6 weeks with CT Chest without contrast.

## 2024-10-01 NOTE — PROGRESS NOTES
"Patient name: Issac Back  Patient : 1960  VISIT # [id number]  MR #[id number]    Procedure:Procedure(s):  RIGHT 10th RIB EXCISION  Procedure Date:2024  POD:1 Day Post-Op    Subjective   Chief complaint: Right rib mass    Patient is tolerating room air.  Right chest tube remains in place to 20 cm suction.  Pain is overall well-controlled.           Objective     Visit Vitals  /67 (BP Location: Right arm, Patient Position: Sitting)   Pulse 67   Temp 98.6 °F (37 °C) (Oral)   Resp 16   Ht 181 cm (71.26\")   Wt 119 kg (261 lb 14.5 oz)   SpO2 95%   BMI 36.26 kg/m²       Intake/Output Summary (Last 24 hours) at 10/1/2024 0949  Last data filed at 10/1/2024 0319  Gross per 24 hour   Intake 1240 ml   Output 320 ml   Net 920 ml     RCT: 320 mL since surgery, serosanguineous, no airleak    Lab:     CBC:  Results from last 7 days   Lab Units 10/01/24  0243 24  1114 24  1332   WBC 10*3/mm3 12.94* 9.86 10.01   HEMATOCRIT % 42.0 40.6 44.1   PLATELETS 10*3/mm3 222 198 242          BMP:  Results from last 7 days   Lab Units 10/01/24  0243 24  1114 24  1332   SODIUM mmol/L 138 137 142   POTASSIUM mmol/L 3.4* 3.6 3.8   CHLORIDE mmol/L 100 102 103   CO2 mmol/L 25.0 26.0 29.0   GLUCOSE mg/dL 138* 185* 147*   BUN mg/dL 18 19 22   CREATININE mg/dL 0.81 0.83 0.89          COAG:  Results from last 7 days   Lab Units 24  1332   INR  1.02   APTT seconds 30.7       IMAGES:       Imaging Results (Last 24 Hours)       Procedure Component Value Units Date/Time    XR Chest 1 View [[id number]] Collected: 10/01/24 0708     Updated: 10/01/24 0712    Narrative:      EXAMINATION: XR CHEST 1 VW- 10/1/2024 7:08 AM     HISTORY: post op lung surgery.     COMPARISON: Chest x-ray 2024.     REPORT:  The lungs are hypoaerated, no focal infiltrate or pulmonary  consolidation is identified. No pneumothorax or effusion is identified.  The right basilar chest tube remains in satisfactory position. The heart  is " enlarged, no evidence of CHF. The osseous structures show no acute  findings.       Impression:      Shallow inspiration, no acute cardiopulmonary abnormality.  No change in position of the right basilar chest tube.        This report was signed and finalized on 10/1/2024 7:09 AM by Dr. Chandra Fitzgerald MD.       XR Chest 1 View [[id number]] Collected: 09/30/24 1138     Updated: 09/30/24 1142    Narrative:      EXAMINATION: XR CHEST 1 VW-  9/30/2024 11:38 AM     HISTORY: Postop lung surgery.     FINDINGS: Today's exam is compared to previous study of 3 days earlier.  The lungs are hypoventilated causing accentuation of the bronchovascular  markings and cardiac silhouette. A right-sided thoracostomy tube  projects over the right lung base. There is no pneumothorax. Mild volume  overload suspected with mild widening of the vascular pedicle. This is  accentuated by the expiratory nature of the film. No free air beneath  the hemidiaphragms.       Impression:      1.. Right-sided thoracostomy tube draped over the right diaphragm. No  pneumothorax.  2. Expiratory chest with mild basilar atelectasis. A mild element of  volume overload is suspected with widening of the vascular pedicle and  fullness of the right paratracheal stripe and azygos fullness.     This report was signed and finalized on 9/30/2024 11:39 AM by Dr. Maurice Hernandez MD.             CXR: Right chest tube in stable position with no pneumothorax.  Hypoventilation    Physical Exam:  General: Alert, oriented. No apparent distress.   Cardiovascular: Regular rate and rhythm without murmur, rubs, or gallops.    Pulmonary: Clear to auscultation bilaterally without wheezing, rubs, or rales.  Chest: Thoracic incisions clean, dry, and intact.  Right chest tube to 20 cm suction. No air leak. Fluid is serosanguineous.   Abdomen: Soft, nondistended, and nontender.  Extremities: Warm, moves all extremities. No edema.   Neurologic:  Grossly intact with no focal  deficits.            Impression:  Rib mass, right 10th rib  Obesity, BMI 36        Plan:  Right chest tube removed without remark  PA lateral chest x-ray today at 1230  Discontinue IV fluids  Encourage pulmonary toilet and ambulation  Routine post thoracic surgery care  Likely okay for discharge home this afternoon if follow-up imaging is stable and pain is well-controlled.  Discussed with patient and nursing.      Fifi Brown, ROLAND  10/01/24  09:49 CDT

## 2024-10-01 NOTE — PLAN OF CARE
Goal Outcome Evaluation:           Progress: improving    Outcome Evaluation: A/o x 4. R Chest tube in place -20cm dry suction. Up with standby. Ambulated in hallway x 2. Voiding. Fluids and ABX infusing per orders. PRN pain meds given x1. Dressing changed per orders. VSS. Safety maintained.

## 2024-10-01 NOTE — PLAN OF CARE
Goal Outcome Evaluation:                 Patient alert and oriented x4. Ambulating in room and hallway with assistance from family. Chest tube pulled this morning by APRN. Dressing in place with small amount of serosanguinous. Awaiting follow up x-ray results.

## 2024-10-03 ENCOUNTER — HOSPITAL ENCOUNTER (OUTPATIENT)
Dept: GENERAL RADIOLOGY | Facility: HOSPITAL | Age: 64
Discharge: HOME | End: 2024-10-03
Admitting: NURSE PRACTITIONER
Payer: MEDICARE

## 2024-10-03 ENCOUNTER — TELEPHONE (OUTPATIENT)
Dept: CARDIAC SURGERY | Facility: CLINIC | Age: 64
End: 2024-10-03
Payer: MEDICARE

## 2024-10-03 ENCOUNTER — OFFICE VISIT (OUTPATIENT)
Dept: CARDIAC SURGERY | Facility: CLINIC | Age: 64
End: 2024-10-03
Payer: MEDICARE

## 2024-10-03 VITALS
SYSTOLIC BLOOD PRESSURE: 145 MMHG | HEART RATE: 82 BPM | OXYGEN SATURATION: 97 % | WEIGHT: 265.8 LBS | HEIGHT: 71 IN | BODY MASS INDEX: 37.21 KG/M2 | DIASTOLIC BLOOD PRESSURE: 100 MMHG

## 2024-10-03 DIAGNOSIS — M89.9 RIB LESION: ICD-10-CM

## 2024-10-03 DIAGNOSIS — M89.9 RIB LESION: Primary | ICD-10-CM

## 2024-10-03 PROCEDURE — 1159F MED LIST DOCD IN RCRD: CPT | Performed by: NURSE PRACTITIONER

## 2024-10-03 PROCEDURE — 99024 POSTOP FOLLOW-UP VISIT: CPT | Performed by: NURSE PRACTITIONER

## 2024-10-03 PROCEDURE — 71046 X-RAY EXAM CHEST 2 VIEWS: CPT

## 2024-10-03 PROCEDURE — 1160F RVW MEDS BY RX/DR IN RCRD: CPT | Performed by: NURSE PRACTITIONER

## 2024-10-03 NOTE — TELEPHONE ENCOUNTER
Pt calling.  States had surgery Monday and the area where tube removed has occlusive dressing on it.  Pt states all around this is red, burning and stinging.  States is having yellow drainage.  Denies systemic fever.  Calling to find out what to do.  Can reach him at #[phone]/blu

## 2024-10-03 NOTE — PROGRESS NOTES
Subjective   Chief Complaint   Patient presents with    Wound Check     Patient is here for wound check       Patient ID: Issac Back is a 64 y.o. male who is here for follow-up having had Excision of Rib, Right 10th Rib, Resection of and Reconstruction of Diaphragm and Chest Wall by Dr. Licona on 9/30/2024     History of Present Illness  Mr. Back is a 64-year-old male who underwent the above listed procedure by Dr. Licona on 9/30/2024.  Postoperative recovery was unremarkable and the right chest tube was removed on postop day 1.  He met criteria for discharge home later in the day on postop day 1.  He called the office this morning to report redness, burning, and stinging around his chest tube site.  He was advised follow-up with me today to evaluate.  He states he is in pain this morning.  Original chest tube dressing remains in place.  No fevers or chills.    The following portions of the patient's history were reviewed and updated as appropriate: allergies, current medications, past family history, past medical history, past social history, past surgical history and problem list.    Review of Systems   Constitutional:  Negative for chills, diaphoresis, fatigue and fever.   HENT:  Negative for trouble swallowing and voice change.    Eyes:  Negative for visual disturbance.   Respiratory:  Negative for chest tightness and shortness of breath.    Cardiovascular:  Negative for chest pain, palpitations and leg swelling.   Gastrointestinal:  Negative for abdominal pain, diarrhea, nausea and vomiting.   Musculoskeletal:  Negative for arthralgias and myalgias.   Skin:  Positive for rash (right chest). Negative for color change, pallor and wound.   Neurological:  Negative for dizziness, syncope and light-headedness.   Psychiatric/Behavioral:  Negative for agitation, confusion and sleep disturbance.        Objective   Visit Vitals  /100 (BP Location: Right arm, Patient Position: Sitting, Cuff Size: Adult) Comment: pt  "is in pain and hasn't taken BP med today   Pulse 82   Ht 181 cm (71.26\")   Wt 121 kg (265 lb 12.8 oz)   SpO2 97%   BMI 36.80 kg/m²       Physical Exam  Vitals reviewed.   Constitutional:       General: He is not in acute distress.  HENT:      Head: Normocephalic.   Eyes:      Pupils: Pupils are equal, round, and reactive to light.   Cardiovascular:      Rate and Rhythm: Normal rate and regular rhythm.      Heart sounds: Normal heart sounds. No murmur heard.  Pulmonary:      Breath sounds: Normal breath sounds. No wheezing or rales.   Abdominal:      General: There is no distension.      Palpations: Abdomen is soft.      Tenderness: There is no abdominal tenderness.   Musculoskeletal:         General: No swelling or tenderness.   Skin:     General: Skin is warm and dry.      Comments: Surgical incisions are clean dry and intact.  The right chest tube site is clean dry and intact.  He does have erythema and blistering surrounding the chest tube site in line with occlusive dressing and appears to be a reaction to Tegaderm and tape.  Dressing removed without remark.  No purulent drainage or warmth.   Neurological:      General: No focal deficit present.      Mental Status: He is alert and oriented to person, place, and time.   Psychiatric:         Mood and Affect: Mood normal.         Behavior: Behavior normal.         Thought Content: Thought content normal.         Judgment: Judgment normal.             Assessment & Plan     Independent Review of Radiographic Studies:    CXR: No pneumothorax.  Trace right basilar pleural effusion.  Overall stable exam.    Diagnoses and all orders for this visit:    1. Rib lesion (Primary)           Overall, Issac Back is doing well.   He does have erythema and blistering and has had a reaction to dressing at chest tube site.  This was removed without remark.  I have advised him to clean incision and blisters with antibacterial soap and water.  Avoid ointments and creams.  I do not " suspect infection today and likely symptoms should improve in the next couple of days.  Patient is to call me if symptoms worsen or do not improve.  Final pathology results are still pending and I will call patient when these are available.  He does complain of constipation since surgery and I have advised him to use stool softeners and MiraLAX especially while using pain medication.  If no bowel movement can use suppository or enema.  He verbalizes understanding.  Keep follow-up with me for now on 10/17/2024 and if symptoms are improving we can cancel this follow-up appointment.  Following post op thoracic surgery home instructions. Provided support and encouragement. All questions have been answered to the best of my ability.  Patient has been instructed to contact our office with any questions or concerns should they arise prior to the next office visit.  He has scheduled follow-up with Dr. Licona on 11/14/2024 with CT chest prior to appointment.    Issac Back is a non-smoker and therefore does not need tobacco cessation education/counseling.      Advance Care Planning   ACP discussion was held with the patient during this visit. Patient does not have an advance directive, declines further assistance.

## 2024-10-10 LAB
CYTO UR: NORMAL
LAB AP CASE REPORT: NORMAL
Lab: NORMAL
PATH REPORT.FINAL DX SPEC: NORMAL
PATH REPORT.GROSS SPEC: NORMAL

## 2024-10-15 DIAGNOSIS — M89.9 RIB LESION: Primary | ICD-10-CM

## 2024-10-15 NOTE — PROGRESS NOTES
Subjective   Chief Complaint   Patient presents with    Post-op Follow-up     Pt is here for follow up w/ CXR  Pt had right 10th rib excision on 09/30/2024       Patient ID: Issac Back is a 64 y.o. male who is here for follow-up having had Excision of Rib, Right 10th Rib, Resection of and Reconstruction of Diaphragm and Chest Wall by Dr. Licona on 9/30/2024     History of Present Illness  Mr. Back is a 64-year-old male who underwent the above listed procedure by Dr. Licona on 9/30/2024.  Postoperative recovery was unremarkable and the right chest tube was removed on postop day 1.  He met criteria for discharge home later in the day on postop day 1.  He called the office a couple of weeks ago to report redness, burning, and stinging around his chest tube site.  He was evaluated on 10/3/2024 and found to have a contact dermatitis from chest tube dressings.  No erythema, warmth, purulent drainage.  He was advised to clean incisions and right chest wall with antibacterial soap and water.  Surgical incisions were healing nicely.  Today he is here for 2-week follow-up and to review final pathology results.  He states rash has improved.  He does complain of some right chest wall nerve pain.    The following portions of the patient's history were reviewed and updated as appropriate: allergies, current medications, past family history, past medical history, past social history, past surgical history and problem list.    Review of Systems   Constitutional:  Negative for chills, diaphoresis, fatigue and fever.   HENT:  Negative for trouble swallowing and voice change.    Eyes:  Negative for visual disturbance.   Respiratory:  Negative for chest tightness and shortness of breath.    Cardiovascular:  Negative for chest pain, palpitations and leg swelling.   Gastrointestinal:  Negative for abdominal pain, diarrhea, nausea and vomiting.   Musculoskeletal:  Negative for arthralgias and myalgias.   Skin:  Negative for color change,  "pallor, rash and wound.   Neurological:  Negative for dizziness, syncope and light-headedness.   Psychiatric/Behavioral:  Negative for agitation, confusion and sleep disturbance.        Objective   Visit Vitals  /82 (BP Location: Right arm, Patient Position: Sitting, Cuff Size: Adult)   Pulse 72   Ht 181 cm (71.26\")   Wt 121 kg (267 lb)   SpO2 96%   BMI 36.97 kg/m²         Physical Exam  Vitals reviewed.   Constitutional:       General: He is not in acute distress.  HENT:      Head: Normocephalic.   Eyes:      Pupils: Pupils are equal, round, and reactive to light.   Cardiovascular:      Rate and Rhythm: Normal rate and regular rhythm.      Heart sounds: Normal heart sounds. No murmur heard.  Pulmonary:      Breath sounds: Normal breath sounds. No wheezing or rales.   Abdominal:      General: There is no distension.      Palpations: Abdomen is soft.      Tenderness: There is no abdominal tenderness.   Musculoskeletal:         General: No swelling or tenderness.   Skin:     General: Skin is warm and dry.      Comments: Surgical incisions are clean dry and intact.  Significant improvement in right chest wall blistering rash.  No purulent drainage, erythema, or warmth.   Neurological:      General: No focal deficit present.      Mental Status: He is alert and oriented to person, place, and time.   Psychiatric:         Mood and Affect: Mood normal.         Behavior: Behavior normal.         Thought Content: Thought content normal.         Judgment: Judgment normal.             Assessment & Plan       CXR: No pneumothorax.  Moderate right pleural effusion and atelectasis.    Diagnoses and all orders for this visit:    1. Rib lesion (Primary)           Overall, Issac Back is doing well.  Final pathology reviewed.  He has been advised to continue 5 pound weight restriction until his upcoming appointment with Dr. Licona.  I did offer to send a short prescription for Lyrica to help with nerve pain, patient declines at " this time.  I have advised him to call me if he changes his mind.  Otherwise he is doing very well.  Following post op thoracic surgery home instructions. Provided support and encouragement. All questions have been answered to the best of my ability.  Patient has been instructed to contact our office with any questions or concerns should they arise prior to the next office visit.  He has scheduled follow-up with Dr. Licona on 11/14/2024 with CT chest prior to appointment.    Issac Back is a non-smoker and therefore does not need tobacco cessation education/counseling.      Advance Care Planning   ACP discussion was held with the patient during this visit. Patient does not have an advance directive, declines further assistance.

## 2024-10-17 ENCOUNTER — HOSPITAL ENCOUNTER (OUTPATIENT)
Dept: GENERAL RADIOLOGY | Facility: HOSPITAL | Age: 64
Discharge: HOME | End: 2024-10-17
Admitting: NURSE PRACTITIONER
Payer: MEDICARE

## 2024-10-17 ENCOUNTER — OFFICE VISIT (OUTPATIENT)
Dept: CARDIAC SURGERY | Facility: CLINIC | Age: 64
End: 2024-10-17
Payer: MEDICARE

## 2024-10-17 VITALS
HEART RATE: 72 BPM | BODY MASS INDEX: 37.38 KG/M2 | OXYGEN SATURATION: 96 % | HEIGHT: 71 IN | SYSTOLIC BLOOD PRESSURE: 146 MMHG | DIASTOLIC BLOOD PRESSURE: 82 MMHG | WEIGHT: 267 LBS

## 2024-10-17 DIAGNOSIS — M89.9 RIB LESION: Primary | ICD-10-CM

## 2024-10-17 DIAGNOSIS — M89.9 RIB LESION: ICD-10-CM

## 2024-10-17 PROCEDURE — 1159F MED LIST DOCD IN RCRD: CPT | Performed by: NURSE PRACTITIONER

## 2024-10-17 PROCEDURE — 71046 X-RAY EXAM CHEST 2 VIEWS: CPT

## 2024-10-17 PROCEDURE — 99024 POSTOP FOLLOW-UP VISIT: CPT | Performed by: NURSE PRACTITIONER

## 2024-10-17 PROCEDURE — 1160F RVW MEDS BY RX/DR IN RCRD: CPT | Performed by: NURSE PRACTITIONER

## 2024-11-14 ENCOUNTER — TRANSCRIBE ORDERS (OUTPATIENT)
Dept: ADMINISTRATIVE | Facility: HOSPITAL | Age: 64
End: 2024-11-14
Payer: MEDICARE

## 2024-11-14 ENCOUNTER — HOSPITAL ENCOUNTER (OUTPATIENT)
Dept: CT IMAGING | Facility: HOSPITAL | Age: 64
Discharge: HOME | End: 2024-11-14
Admitting: NURSE PRACTITIONER
Payer: MEDICARE

## 2024-11-14 ENCOUNTER — OFFICE VISIT (OUTPATIENT)
Dept: CARDIAC SURGERY | Facility: CLINIC | Age: 64
End: 2024-11-14
Payer: MEDICARE

## 2024-11-14 VITALS
HEIGHT: 71 IN | HEART RATE: 81 BPM | BODY MASS INDEX: 36.88 KG/M2 | SYSTOLIC BLOOD PRESSURE: 137 MMHG | DIASTOLIC BLOOD PRESSURE: 70 MMHG | WEIGHT: 263.4 LBS | OXYGEN SATURATION: 96 %

## 2024-11-14 DIAGNOSIS — M89.9 RIB LESION: Primary | ICD-10-CM

## 2024-11-14 DIAGNOSIS — R22.2 CHEST WALL MASS: ICD-10-CM

## 2024-11-14 DIAGNOSIS — J90 PLEURAL EFFUSION ON RIGHT: Primary | ICD-10-CM

## 2024-11-14 PROCEDURE — 1159F MED LIST DOCD IN RCRD: CPT | Performed by: SURGERY

## 2024-11-14 PROCEDURE — 1160F RVW MEDS BY RX/DR IN RCRD: CPT | Performed by: SURGERY

## 2024-11-14 PROCEDURE — 71250 CT THORAX DX C-: CPT

## 2024-11-14 PROCEDURE — 99024 POSTOP FOLLOW-UP VISIT: CPT | Performed by: SURGERY

## 2024-11-14 RX ORDER — TRIAMCINOLONE ACETONIDE 5 MG/G
1 CREAM TOPICAL 3 TIMES DAILY
COMMUNITY
End: 2024-11-15

## 2024-11-14 RX ORDER — BENZONATATE 200 MG/1
200 CAPSULE ORAL 3 TIMES DAILY PRN
COMMUNITY

## 2024-11-14 NOTE — LETTER
November 14, 2024     Anupam Thorne MD  [address]  [place] [address]    Patient: Issac Back   YOB: 1960   Date of Visit: 11/14/2024       Dear Anupam Thorne MD,    Issac Back was in my office today. Below are the relevant portions of my assessment and plan of care.    Mr. Back is a 64-year-old male who returns to me in follow-up 6 weeks after right 10th rib excision diaphragm reconstruction.  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  He has had some bulging of his surgical incision and some shortness of breath, CT scan today shows a moderate to large right pleural effusion which I believe is likely contributing to this.  I do worry some about a small hernia at the surgical site although if this is the case I do not believe will be pathologic as this is abutting the diaphragm and lung herniation would be very unlikely even with full drainage of the pleural effusion.  He is symptomatic with shortness of breath and difficulty with laying flat.  Given this I recommend thoracentesis.    I described to him expectations of thoracentesis and we will perform this tomorrow.  We discussed the risk and benefits he understands.  After this I would like for him to wear abdominal binder and then we will reimage him in a month to see that the pleural effusion is improved and hopefully cause some scar tissue to form to close down the space that is extrapleural.    Thank you for trusting me with the care of Mr. Back.  Please do not hesitate to call questions or concerns.         Sincerely,        Mariano Licona MD        CC: No Recipients

## 2024-11-14 NOTE — PROGRESS NOTES
"  [facility] Cardiothoracic Surgery  PROGRESS NOTE      Procedure Performed: Right 10th Rib and Chest Wall Excision  Date of Procedure: 9/30/2024      History of Present Illness  The patient presents for evaluation of a persistent dry cough. He is accompanied by an adult female.    He has been experiencing this cough since his last visit with Fifi. He reports a sensation of fluid in his chest, which he can feel. Although his cough has slightly improved, he finds it difficult to lie down and often needs to sit up, which has disrupted his sleep. He does not use an abdominal binder. His lungs were examined and found to be in good condition. He is not currently taking any blood thinners.    Additionally, he developed a skin condition resembling eczema, characterized by severe itching and small blisters. A cream was prescribed for this condition.    He experienced a sneeze immediately after surgery. He finds it difficult to walk and often feels short of breath. He is unable to lie on his back or left side.       Objective:      11/14/24  1108   Weight: 119 kg (263 lb 6.4 oz)              PE:  Visit Vitals  /70   Pulse 81   Ht 181 cm (71.26\")   Wt 119 kg (263 lb 6.4 oz)   SpO2 96%   BMI 36.47 kg/m²        Physical Exam  PE:  Vitals:    11/14/24 1108   BP: 137/70   Pulse: 81   SpO2: 96%     GENERAL: NAD, resting comfortably, normal palor  CARDIOVASCULAR: regular, regular rate, sinus  PULMONARY: Normal bilateral breath sounds, no labored breathing, bulging of surgical site on right with intact skin closure and appropriately healing, fluid wave present  ABDOMEN: soft, nt/nd  EXTREMITIES: mild peripheral edema, normal pulses, normal ROM                 Lab Results (last 72 hours)      ** No results found for the last 72 hours. **         Results  Imaging  I personally viewed CT scan chest the following is my interpretation:  There is a moderate to large right pleural effusion, the effusion extends to " the surgical site with some soft tissue fluid collection consistent with seroma and pleural effusion.  I do not see a definite muscular defect.  I suspect seepage of fluid into the soft tissues from pleural effusion.  There is significant atelectasis associated.  Diaphragm appears intact.       Assessment/Plan         Assessment & Plan    Mr. Back is a 64-year-old male who returns to me in follow-up 6 weeks after right 10th rib excision diaphragm reconstruction.  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  He has had some bulging of his surgical incision and some shortness of breath, CT scan today shows a moderate to large right pleural effusion which I believe is likely contributing to this.  I do worry some about a small hernia at the surgical site although if this is the case I do not believe will be pathologic as this is abutting the diaphragm and lung herniation would be very unlikely even with full drainage of the pleural effusion.  He is symptomatic with shortness of breath and difficulty with laying flat.  Given this I recommend thoracentesis.    I described to him expectations of thoracentesis and we will perform this tomorrow.  We discussed the risk and benefits he understands.  After this I would like for him to wear abdominal binder and then we will reimage him in a month to see that the pleural effusion is improved and hopefully cause some scar tissue to form to close down the space that is extrapleural.    Thank you for trusting me with the care of Mr. Back.  Please do not hesitate to call questions or concerns.         Mariano Licona MD   Cardiothoracic Surgeon      Patient or patient representative verbalized consent for the use of Ambient Listening during the visit with  Mariano Licona MD for chart documentation. 11/14/2024  12:16 CST  Answers submitted by the patient for this visit:  Primary Reason for Visit (Submitted on 11/13/2024)  What is the primary reason for  your visit?: Shortness of Breath  Shortness of Breath Questionnaire (Submitted on 11/13/2024)  Chief Complaint: Shortness of breath  Chronicity: new  Onset: more than 1 month ago  Progression since onset: improving  Fever: no fever  sputum production: No  dry cough: Yes  wheezing: Yes  Aggravating factors: lying flat  asthma: No  COPD: No

## 2024-11-15 ENCOUNTER — HOSPITAL ENCOUNTER (OUTPATIENT)
Dept: GENERAL RADIOLOGY | Facility: HOSPITAL | Age: 64
Discharge: HOME | End: 2024-11-15
Payer: MEDICARE

## 2024-11-15 ENCOUNTER — HOSPITAL ENCOUNTER (OUTPATIENT)
Dept: CARDIOLOGY | Facility: HOSPITAL | Age: 64
Setting detail: HOSPITAL OUTPATIENT SURGERY
Discharge: HOME | End: 2024-11-15
Payer: MEDICARE

## 2024-11-15 VITALS
DIASTOLIC BLOOD PRESSURE: 74 MMHG | HEART RATE: 83 BPM | RESPIRATION RATE: 18 BRPM | OXYGEN SATURATION: 95 % | SYSTOLIC BLOOD PRESSURE: 122 MMHG | BODY MASS INDEX: 35.08 KG/M2 | HEIGHT: 72 IN | WEIGHT: 259 LBS | TEMPERATURE: 97.3 F

## 2024-11-15 DIAGNOSIS — J90 PLEURAL EFFUSION ON RIGHT: ICD-10-CM

## 2024-11-15 PROCEDURE — 71046 X-RAY EXAM CHEST 2 VIEWS: CPT

## 2024-11-15 RX ORDER — LIDOCAINE HYDROCHLORIDE AND EPINEPHRINE BITARTRATE 20; .01 MG/ML; MG/ML
INJECTION, SOLUTION SUBCUTANEOUS
Status: DISCONTINUED
Start: 2024-11-15 | End: 2024-11-15 | Stop reason: WASHOUT

## 2024-11-16 NOTE — OP NOTE
Thoracentesis Note    Preoperative Diagnosis: Moderate to large right pleural effusion after rib resection    Postoperative Diagnosis: Same    Procedure Performed: Thoracentesis, right    Surgeon: Mariano Licona M.D.    Indications: 64-year-old male who underwent right rib resection and chest wall excision on September 30, approximately 6 weeks ago.  He return to clinic and CT scan showed a moderate to large right pleural effusion with fluid extending into the resection space.  Given that discussed with him draining this with thoracentesis, he understood the risk and benefits and agreed to proceed.      Findings: Successful thoracentesis with drainage of approximately 1.1 L of yellow fluid nonbloody    Procedure: Informed consent was obtained.  Patient was placed in sitting position.  Posterior right chest was prepped and draped in normal sterile fashion.  Ultrasound was used to identify free-flowing pleural effusion and spot was marked. Local anesthesia was injected.  Small skin nick was made and while aspirating safety-centesis setwas advanced into the pleural cavity.  Catheter was connected to close drainage bag and manually 1.1 L of yellow fluid was drained.  Once it stopped draining, catheter was removed and dressing was placed. Patient tolerated the procedure well and a stat chest x-ray was ordered.      Specimens: None    EBL: Minimal    Mariano Licona MD  Cardiothoracic Surgeon

## 2024-11-20 ENCOUNTER — TELEPHONE (OUTPATIENT)
Dept: CARDIAC SURGERY | Facility: CLINIC | Age: 64
End: 2024-11-20
Payer: MEDICARE

## 2024-11-20 NOTE — TELEPHONE ENCOUNTER
Message left with patient to call back.  I have discussed with Dr. Licona and he would like to get a CT of the chest 2 weeks from thoracentesis last Friday instead of waiting a month and then see patient in follow-up.  Please move CT of the chest up 2 weeks as well as appointment with Dr. Licona.  If patient calls back please notify him of this.

## 2024-11-20 NOTE — TELEPHONE ENCOUNTER
Pt called back and informed as directed.  Appt resched for 12-5-24 at 11:30.  Pt will await call from our office with new CT appt/kahm

## 2024-11-20 NOTE — TELEPHONE ENCOUNTER
Pt calling.  States he had a procedure recently with Dr Licona and Dr Licona told him to call if cough returned.  Pt states he has started having the dry cough again.  Calling to find out what to do.  Can reach him at #[phone]/blu

## 2024-12-05 ENCOUNTER — HOSPITAL ENCOUNTER (OUTPATIENT)
Dept: CT IMAGING | Facility: HOSPITAL | Age: 64
Discharge: HOME | End: 2024-12-05
Admitting: NURSE PRACTITIONER
Payer: MEDICARE

## 2024-12-05 ENCOUNTER — OFFICE VISIT (OUTPATIENT)
Dept: CARDIAC SURGERY | Facility: CLINIC | Age: 64
End: 2024-12-05
Payer: MEDICARE

## 2024-12-05 VITALS
BODY MASS INDEX: 35.21 KG/M2 | HEIGHT: 72 IN | OXYGEN SATURATION: 97 % | DIASTOLIC BLOOD PRESSURE: 88 MMHG | HEART RATE: 74 BPM | WEIGHT: 260 LBS | SYSTOLIC BLOOD PRESSURE: 152 MMHG

## 2024-12-05 DIAGNOSIS — J90 PLEURAL EFFUSION: ICD-10-CM

## 2024-12-05 DIAGNOSIS — J90 PLEURAL EFFUSION ON RIGHT: ICD-10-CM

## 2024-12-05 DIAGNOSIS — R22.2 CHEST WALL MASS: Primary | ICD-10-CM

## 2024-12-05 DIAGNOSIS — J90 PLEURAL EFFUSION ON RIGHT: Primary | ICD-10-CM

## 2024-12-05 LAB — CREAT BLDA-MCNC: 1 MG/DL (ref 0.6–1.3)

## 2024-12-05 PROCEDURE — 1159F MED LIST DOCD IN RCRD: CPT | Performed by: SURGERY

## 2024-12-05 PROCEDURE — 71260 CT THORAX DX C+: CPT

## 2024-12-05 PROCEDURE — 1160F RVW MEDS BY RX/DR IN RCRD: CPT | Performed by: SURGERY

## 2024-12-05 PROCEDURE — 25510000001 IOPAMIDOL 61 % SOLUTION: Performed by: NURSE PRACTITIONER

## 2024-12-05 PROCEDURE — 99024 POSTOP FOLLOW-UP VISIT: CPT | Performed by: SURGERY

## 2024-12-05 PROCEDURE — 82565 ASSAY OF CREATININE: CPT

## 2024-12-05 RX ORDER — IOPAMIDOL 612 MG/ML
100 INJECTION, SOLUTION INTRAVASCULAR
Status: COMPLETED | OUTPATIENT
Start: 2024-12-05 | End: 2024-12-05

## 2024-12-05 RX ORDER — FUROSEMIDE 20 MG/1
20 TABLET ORAL DAILY
Qty: 7 TABLET | Refills: 0 | Status: SHIPPED | OUTPATIENT
Start: 2024-12-05

## 2024-12-05 RX ORDER — POTASSIUM CHLORIDE 750 MG/1
10 CAPSULE, EXTENDED RELEASE ORAL DAILY
Qty: 7 CAPSULE | Refills: 0 | Status: SHIPPED | OUTPATIENT
Start: 2024-12-05 | End: 2024-12-12

## 2024-12-05 RX ADMIN — IOPAMIDOL 100 ML: 612 INJECTION, SOLUTION INTRAVENOUS at 11:16

## 2024-12-05 NOTE — PROGRESS NOTES
[facility] Cardiothoracic Surgery  PROGRESS NOTE   CC: F/u with Right Pleural Effusion, s/p Chest Wall Resection    Subjective:   History of Present Illness  The patient presents for evaluation of fluid accumulation.    He reports an improvement in his condition, although he still experiences some protrusion. After the fluid was drained, he felt better for about a week before a cough developed. He is considering the use of Lasix for a period of 7 to 10 days. He experiences pain when taking a deep breath. He recalls feeling well after the surgery and was able to breathe comfortably after the fluid was drained. He notes that the right side of his body felt better 5 weeks post-surgery.      ROS: Cardiovascular ROS: positive for - dyspnea on exertion and cough      Objective:      Physical Exam  PE:  Vitals:    12/05/24 1122   BP: 152/88   Pulse: 74   SpO2: 97%     GENERAL: NAD, resting comfortably, normal palor  CARDIOVASCULAR: regular, regular rate, sinus  PULMONARY: Normal bilateral breath sounds, no labored breathing, surgical site with protrusion but much improved, fluid wave still present, skin intact and healed appropriately  ABDOMEN: soft, nt/nd  EXTREMITIES: mild peripheral edema, normal pulses, normal ROM        Lab Results (last 72 hours)       ** No results found for the last 72 hours. **              Results  I personally reviewed CT scan of the chest the following is my interpretation:  Pleural effusion is moderate on the right much smaller than last pleural effusion prior to drainage but still there with some lower lobe atelectasis, extrapleural component is much smaller    Assessment & Plan     Assessment & Plan    Mr. Back is a 64-year-old male he returns to me in follow-up after thoracentesis 3 weeks ago.  On September 30 performed right 10th rib excision with diaphragm reconstruction and complex chest wall reconstruction.  He has had some dehiscence of the musculature of his  chest wall reconstruction but also had a right pleural effusion.  I perform thoracentesis and he felt better for about a week but now is having coughing like he did before.  He returns today with a repeat CT scan.  This shows reaccumulation of pleural effusion albeit much smaller than last time but still moderate size pleural effusion.  The portion that was extrathoracic is much smaller.  He is wearing a binder with some compression at that location.    I discussed with Mr. Back today the treatment options for his recurrent effusion.  I offered him options of attempt of medical course of treatment with Lasix, repeat thoracentesis, possible admission with a pigtail catheter placement for more definitive drainage and even possible talc pleurodesis to allow for scar tissue to form.  He would like to trial a course of Lasix.  I did discuss with him I am happy to repeat thoracentesis at any point if it gets to the point that he wants this drain to improve his symptoms.  I do believe the further out we get from surgery the less inflammation though given the less likely it will reaccumulate although given reaccumulation once there is a risk of this again.  If he would like to be admitted to the hospital for pigtail catheter placement drainage and possible talc pleurodesis I am happy to do that as well.  We will plan on Lasix for a week and then repeat x-ray in 2 weeks with reassessment of his symptoms at that time.    Thank you for trusting with the care of Mr. Back.  Please do not hesitate to call questions or concerns.        Mariano Licona MD   Cardiothoracic Surgeon    Patient or patient representative verbalized consent for the use of Ambient Listening during the visit with  Mariano Licona MD for chart documentation. 12/5/2024  11:57 CST

## 2024-12-05 NOTE — LETTER
December 5, 2024     Anupam Thorne MD  [address] [place] [address] [place] [address]    Patient: Issac Back   YOB: 1960   Date of Visit: 12/5/2024       Dear Anupam Thorne MD,    Issac Back was in my office today. Below are the relevant portions of my assessment and plan of care.    Mr. Back is a 64-year-old male he returns to me in follow-up after thoracentesis 3 weeks ago.  On September 30 performed right 10th rib excision with diaphragm reconstruction and complex chest wall reconstruction.  He has had some dehiscence of the musculature of his chest wall reconstruction but also had a right pleural effusion.  I perform thoracentesis and he felt better for about a week but now is having coughing like he did before.  He returns today with a repeat CT scan.  This shows reaccumulation of pleural effusion albeit much smaller than last time but still moderate size pleural effusion.  The portion that was extrathoracic is much smaller.  He is wearing a binder with some compression at that location.    I discussed with Mr. Back today the treatment options for his recurrent effusion.  I offered him options of attempt of medical course of treatment with Lasix, repeat thoracentesis, possible admission with a pigtail catheter placement for more definitive drainage and even possible talc pleurodesis to allow for scar tissue to form.  He would like to trial a course of Lasix.  I did discuss with him I am happy to repeat thoracentesis at any point if it gets to the point that he wants this drain to improve his symptoms.  I do believe the further out we get from surgery the less inflammation though given the less likely it will reaccumulate although given reaccumulation once there is a risk of this again.  If he would like to be admitted to the hospital for pigtail catheter placement drainage and possible talc pleurodesis I am happy to do that as well.  We will plan on Lasix for a week and then  repeat x-ray in 2 weeks with reassessment of his symptoms at that time.    Thank you for trusting with the care of Mr. Back.  Please do not hesitate to call questions or concerns.         Sincerely,        Mariano Licona MD        CC: No Recipients

## 2024-12-11 ENCOUNTER — TELEPHONE (OUTPATIENT)
Dept: CARDIAC SURGERY | Facility: CLINIC | Age: 64
End: 2024-12-11

## 2024-12-11 RX ORDER — FUROSEMIDE 20 MG/1
20 TABLET ORAL DAILY
Qty: 7 TABLET | Refills: 0 | Status: ON HOLD | OUTPATIENT
Start: 2024-12-11

## 2024-12-11 RX ORDER — POTASSIUM CHLORIDE 750 MG/1
10 CAPSULE, EXTENDED RELEASE ORAL DAILY
Qty: 7 CAPSULE | Refills: 0 | Status: ON HOLD | OUTPATIENT
Start: 2024-12-11 | End: 2024-12-18

## 2024-12-11 NOTE — TELEPHONE ENCOUNTER
Please advise patient that I discussed with Dr. Licona and I sent 1 additional week of Lasix to his pharmacy.

## 2024-12-11 NOTE — TELEPHONE ENCOUNTER
Called patient to discuss.  He states he is out of Lasix and is wondering if he needs additional Lasix until his follow-up with me next week.  I explained to him initially Dr. Licona plan on 1 week of Lasix and then reassess.  He states his breathing may be some better but his main complaint is persistent cough.  He is unable to lie flat without having shortness of breath.  Advised him I will discuss with Dr. Licona further and call him back.  He verbalizes understanding.

## 2024-12-11 NOTE — TELEPHONE ENCOUNTER
Caller: Issac Back    Relationship: Self    Best call back number: [phone]    What is the best time to reach you: ANY    Who are you requesting to speak with (clinical staff, provider,  specific staff member): NELLIE    Do you know the name of the person who called: GRACE    What was the call regarding: MEDICATION CLARIFICATION QUESTION

## 2024-12-16 ENCOUNTER — APPOINTMENT (OUTPATIENT)
Dept: GENERAL RADIOLOGY | Facility: HOSPITAL | Age: 64
End: 2024-12-16
Payer: MEDICARE

## 2024-12-16 ENCOUNTER — APPOINTMENT (OUTPATIENT)
Dept: CT IMAGING | Facility: HOSPITAL | Age: 64
End: 2024-12-16
Payer: MEDICARE

## 2024-12-16 ENCOUNTER — HOSPITAL ENCOUNTER (INPATIENT)
Facility: HOSPITAL | Age: 64
LOS: 8 days | Discharge: HOME | End: 2024-12-24
Attending: EMERGENCY MEDICINE | Admitting: SURGERY
Payer: MEDICARE

## 2024-12-16 ENCOUNTER — TELEPHONE (OUTPATIENT)
Dept: CARDIAC SURGERY | Facility: CLINIC | Age: 64
End: 2024-12-16
Payer: MEDICARE

## 2024-12-16 DIAGNOSIS — J90 RECURRENT RIGHT PLEURAL EFFUSION: ICD-10-CM

## 2024-12-16 DIAGNOSIS — R06.00 DYSPNEA, UNSPECIFIED TYPE: Primary | ICD-10-CM

## 2024-12-16 DIAGNOSIS — J90 PLEURAL EFFUSION: ICD-10-CM

## 2024-12-16 LAB
ALBUMIN SERPL-MCNC: 4.2 G/DL (ref 3.5–5.2)
ALBUMIN/GLOB SERPL: 1.4 G/DL
ALP SERPL-CCNC: 78 U/L (ref 39–117)
ALT SERPL W P-5'-P-CCNC: 39 U/L (ref 1–41)
ANION GAP SERPL CALCULATED.3IONS-SCNC: 10 MMOL/L (ref 5–15)
AST SERPL-CCNC: 27 U/L (ref 1–40)
B PARAPERT DNA SPEC QL NAA+PROBE: NOT DETECTED
B PERT DNA SPEC QL NAA+PROBE: NOT DETECTED
BASOPHILS # BLD AUTO: 0.04 10*3/MM3 (ref 0–0.2)
BASOPHILS NFR BLD AUTO: 0.4 % (ref 0–1.5)
BILIRUB SERPL-MCNC: 1.7 MG/DL (ref 0–1.2)
BUN SERPL-MCNC: 18 MG/DL (ref 8–23)
BUN/CREAT SERPL: 20.9 (ref 7–25)
C PNEUM DNA NPH QL NAA+NON-PROBE: NOT DETECTED
CALCIUM SPEC-SCNC: 9.1 MG/DL (ref 8.6–10.5)
CHLORIDE SERPL-SCNC: 102 MMOL/L (ref 98–107)
CO2 SERPL-SCNC: 28 MMOL/L (ref 22–29)
CREAT SERPL-MCNC: 0.86 MG/DL (ref 0.76–1.27)
D-LACTATE SERPL-SCNC: 1.6 MMOL/L (ref 0.5–2)
DEPRECATED RDW RBC AUTO: 36.9 FL (ref 37–54)
EGFRCR SERPLBLD CKD-EPI 2021: 95.5 ML/MIN/1.73
EOSINOPHIL # BLD AUTO: 0.34 10*3/MM3 (ref 0–0.4)
EOSINOPHIL NFR BLD AUTO: 3.8 % (ref 0.3–6.2)
ERYTHROCYTE [DISTWIDTH] IN BLOOD BY AUTOMATED COUNT: 12.1 % (ref 12.3–15.4)
FLUAV SUBTYP SPEC NAA+PROBE: NOT DETECTED
FLUBV RNA ISLT QL NAA+PROBE: NOT DETECTED
GLOBULIN UR ELPH-MCNC: 3.1 GM/DL
GLUCOSE SERPL-MCNC: 155 MG/DL (ref 65–99)
HADV DNA SPEC NAA+PROBE: NOT DETECTED
HCOV 229E RNA SPEC QL NAA+PROBE: NOT DETECTED
HCOV HKU1 RNA SPEC QL NAA+PROBE: NOT DETECTED
HCOV NL63 RNA SPEC QL NAA+PROBE: NOT DETECTED
HCOV OC43 RNA SPEC QL NAA+PROBE: NOT DETECTED
HCT VFR BLD AUTO: 42.8 % (ref 37.5–51)
HGB BLD-MCNC: 14.1 G/DL (ref 13–17.7)
HMPV RNA NPH QL NAA+NON-PROBE: NOT DETECTED
HPIV1 RNA ISLT QL NAA+PROBE: NOT DETECTED
HPIV2 RNA SPEC QL NAA+PROBE: NOT DETECTED
HPIV3 RNA NPH QL NAA+PROBE: NOT DETECTED
HPIV4 P GENE NPH QL NAA+PROBE: NOT DETECTED
IMM GRANULOCYTES # BLD AUTO: 0.03 10*3/MM3 (ref 0–0.05)
IMM GRANULOCYTES NFR BLD AUTO: 0.3 % (ref 0–0.5)
LYMPHOCYTES # BLD AUTO: 1.24 10*3/MM3 (ref 0.7–3.1)
LYMPHOCYTES NFR BLD AUTO: 13.9 % (ref 19.6–45.3)
M PNEUMO IGG SER IA-ACNC: NOT DETECTED
MAGNESIUM SERPL-MCNC: 2.1 MG/DL (ref 1.6–2.4)
MCH RBC QN AUTO: 27.2 PG (ref 26.6–33)
MCHC RBC AUTO-ENTMCNC: 32.9 G/DL (ref 31.5–35.7)
MCV RBC AUTO: 82.6 FL (ref 79–97)
MONOCYTES # BLD AUTO: 0.62 10*3/MM3 (ref 0.1–0.9)
MONOCYTES NFR BLD AUTO: 7 % (ref 5–12)
NEUTROPHILS NFR BLD AUTO: 6.63 10*3/MM3 (ref 1.7–7)
NEUTROPHILS NFR BLD AUTO: 74.6 % (ref 42.7–76)
NRBC BLD AUTO-RTO: 0 /100 WBC (ref 0–0.2)
NT-PROBNP SERPL-MCNC: 37.8 PG/ML (ref 0–900)
PLATELET # BLD AUTO: 219 10*3/MM3 (ref 140–450)
PMV BLD AUTO: 10.8 FL (ref 6–12)
POTASSIUM SERPL-SCNC: 3.3 MMOL/L (ref 3.5–5.2)
PROCALCITONIN SERPL-MCNC: 0.08 NG/ML (ref 0–0.25)
PROT SERPL-MCNC: 7.3 G/DL (ref 6–8.5)
RBC # BLD AUTO: 5.18 10*6/MM3 (ref 4.14–5.8)
RHINOVIRUS RNA SPEC NAA+PROBE: NOT DETECTED
RSV RNA NPH QL NAA+NON-PROBE: NOT DETECTED
SARS-COV-2 RNA RESP QL NAA+PROBE: NOT DETECTED
SODIUM SERPL-SCNC: 140 MMOL/L (ref 136–145)
WBC NRBC COR # BLD AUTO: 8.9 10*3/MM3 (ref 3.4–10.8)

## 2024-12-16 PROCEDURE — 83605 ASSAY OF LACTIC ACID: CPT | Performed by: EMERGENCY MEDICINE

## 2024-12-16 PROCEDURE — 25010000002 LIDOCAINE 1 % SOLUTION: Performed by: GENERAL PRACTICE

## 2024-12-16 PROCEDURE — 80053 COMPREHEN METABOLIC PANEL: CPT | Performed by: EMERGENCY MEDICINE

## 2024-12-16 PROCEDURE — 83735 ASSAY OF MAGNESIUM: CPT | Performed by: EMERGENCY MEDICINE

## 2024-12-16 PROCEDURE — 0202U NFCT DS 22 TRGT SARS-COV-2: CPT | Performed by: EMERGENCY MEDICINE

## 2024-12-16 PROCEDURE — 36415 COLL VENOUS BLD VENIPUNCTURE: CPT

## 2024-12-16 PROCEDURE — 87040 BLOOD CULTURE FOR BACTERIA: CPT | Performed by: EMERGENCY MEDICINE

## 2024-12-16 PROCEDURE — 0W9930Z DRAINAGE OF RIGHT PLEURAL CAVITY WITH DRAINAGE DEVICE, PERCUTANEOUS APPROACH: ICD-10-PCS | Performed by: SURGERY

## 2024-12-16 PROCEDURE — 71045 X-RAY EXAM CHEST 1 VIEW: CPT

## 2024-12-16 PROCEDURE — 84145 PROCALCITONIN (PCT): CPT | Performed by: EMERGENCY MEDICINE

## 2024-12-16 PROCEDURE — 93010 ELECTROCARDIOGRAM REPORT: CPT | Performed by: INTERNAL MEDICINE

## 2024-12-16 PROCEDURE — 99222 1ST HOSP IP/OBS MODERATE 55: CPT | Performed by: SURGERY

## 2024-12-16 PROCEDURE — 32551 INSERTION OF CHEST TUBE: CPT | Performed by: SURGERY

## 2024-12-16 PROCEDURE — 25010000002 LIDOCAINE 1 % SOLUTION: Performed by: SURGERY

## 2024-12-16 PROCEDURE — 99285 EMERGENCY DEPT VISIT HI MDM: CPT

## 2024-12-16 PROCEDURE — 85025 COMPLETE CBC W/AUTO DIFF WBC: CPT | Performed by: EMERGENCY MEDICINE

## 2024-12-16 PROCEDURE — 83880 ASSAY OF NATRIURETIC PEPTIDE: CPT | Performed by: EMERGENCY MEDICINE

## 2024-12-16 PROCEDURE — 93005 ELECTROCARDIOGRAM TRACING: CPT | Performed by: EMERGENCY MEDICINE

## 2024-12-16 RX ORDER — LIDOCAINE HYDROCHLORIDE 10 MG/ML
10 INJECTION, SOLUTION INFILTRATION; PERINEURAL ONCE
Status: COMPLETED | OUTPATIENT
Start: 2024-12-16 | End: 2024-12-16

## 2024-12-16 RX ORDER — ACETAMINOPHEN 325 MG/1
650 TABLET ORAL EVERY 4 HOURS PRN
Status: DISCONTINUED | OUTPATIENT
Start: 2024-12-16 | End: 2024-12-24 | Stop reason: HOSPADM

## 2024-12-16 RX ORDER — LIDOCAINE HYDROCHLORIDE 10 MG/ML
10 INJECTION, SOLUTION INFILTRATION; PERINEURAL ONCE
Status: DISCONTINUED | OUTPATIENT
Start: 2024-12-16 | End: 2024-12-16

## 2024-12-16 RX ORDER — POTASSIUM CHLORIDE 1500 MG/1
20 TABLET, EXTENDED RELEASE ORAL ONCE
Status: COMPLETED | OUTPATIENT
Start: 2024-12-16 | End: 2024-12-16

## 2024-12-16 RX ORDER — ACETAMINOPHEN 325 MG/1
650 TABLET ORAL EVERY 6 HOURS PRN
COMMUNITY

## 2024-12-16 RX ORDER — IBUPROFEN 200 MG
400 TABLET ORAL EVERY 8 HOURS PRN
COMMUNITY

## 2024-12-16 RX ORDER — ACETAMINOPHEN 650 MG/1
650 SUPPOSITORY RECTAL EVERY 4 HOURS PRN
Status: DISCONTINUED | OUTPATIENT
Start: 2024-12-16 | End: 2024-12-24 | Stop reason: HOSPADM

## 2024-12-16 RX ORDER — LISINOPRIL 20 MG/1
20 TABLET ORAL
Status: DISCONTINUED | OUTPATIENT
Start: 2024-12-16 | End: 2024-12-24 | Stop reason: HOSPADM

## 2024-12-16 RX ORDER — AMLODIPINE BESYLATE 5 MG/1
5 TABLET ORAL
Status: DISCONTINUED | OUTPATIENT
Start: 2024-12-16 | End: 2024-12-24 | Stop reason: HOSPADM

## 2024-12-16 RX ORDER — SODIUM CHLORIDE 9 MG/ML
40 INJECTION, SOLUTION INTRAVENOUS AS NEEDED
Status: DISCONTINUED | OUTPATIENT
Start: 2024-12-16 | End: 2024-12-24 | Stop reason: HOSPADM

## 2024-12-16 RX ORDER — SODIUM CHLORIDE 0.9 % (FLUSH) 0.9 %
10 SYRINGE (ML) INJECTION AS NEEDED
Status: DISCONTINUED | OUTPATIENT
Start: 2024-12-16 | End: 2024-12-24 | Stop reason: HOSPADM

## 2024-12-16 RX ORDER — ACETAMINOPHEN 160 MG/5ML
650 SOLUTION ORAL EVERY 4 HOURS PRN
Status: DISCONTINUED | OUTPATIENT
Start: 2024-12-16 | End: 2024-12-24 | Stop reason: HOSPADM

## 2024-12-16 RX ORDER — SODIUM CHLORIDE 0.9 % (FLUSH) 0.9 %
10 SYRINGE (ML) INJECTION EVERY 12 HOURS SCHEDULED
Status: DISCONTINUED | OUTPATIENT
Start: 2024-12-16 | End: 2024-12-24 | Stop reason: HOSPADM

## 2024-12-16 RX ORDER — IBUPROFEN 800 MG/1
400 TABLET, FILM COATED ORAL EVERY 8 HOURS PRN
Status: DISCONTINUED | OUTPATIENT
Start: 2024-12-16 | End: 2024-12-24 | Stop reason: HOSPADM

## 2024-12-16 RX ADMIN — IBUPROFEN 400 MG: 800 TABLET, FILM COATED ORAL at 17:16

## 2024-12-16 RX ADMIN — POTASSIUM CHLORIDE 20 MEQ: 1500 TABLET, EXTENDED RELEASE ORAL at 12:12

## 2024-12-16 RX ADMIN — Medication 10 ML: at 12:13

## 2024-12-16 RX ADMIN — Medication 10 ML: at 21:31

## 2024-12-16 RX ADMIN — ACETAMINOPHEN 650 MG: 325 TABLET ORAL at 15:12

## 2024-12-16 RX ADMIN — LIDOCAINE HYDROCHLORIDE 10 ML: 10 INJECTION, SOLUTION INFILTRATION; PERINEURAL at 11:25

## 2024-12-16 NOTE — TELEPHONE ENCOUNTER
Pt calling stating he thinks pleural effusion is worsening and the Lasix is not helping.  Pt c/o increased SOA and a raspiness.  Can reach him at #[phone]/tian

## 2024-12-16 NOTE — OP NOTE
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    Indications: Mr. Back is a 64-year-old previously performed right 10th rib excision for a large mass.  He has had a recurrent pleural effusion on the right since    Findings:   Large right pleural effusion on U/S chest    Successful placement of small bore chest tube, no air leak, serous fluid draining    Procedure: Informed consent was obtained.  Patient was placed in sitting position.  Ultrasound was used to identify free-flowing pleural effusion.  Posterior right chest was prepped and draped in normal sterile fashion.  Local anesthesia was injected into the skin and intercostal muscle at the previously marked spot.  Small skin nick was made and while aspirating a needle was advanced into the pleural cavity.  Wire was advanced in the pleural cavity without difficulty.  Dilator was passed over the wire and tract dilated without difficulty.  Pericardiocentesis catheter was advanced over the wire and advanced into the thoracic cavity without difficulty.  Wire was removed and catheter was connected to a closed suction system.  Catheter was sutured into place.  Dressing was placed.  Patient tolerated the procedure well and a stat chest x-ray was ordered.      Specimens: None    EBL: Minimal    Mariano Licona MD  Cardiothoracic Surgeon
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
## 2024-12-16 NOTE — TELEPHONE ENCOUNTER
Called and spoke with patient.  Dr. Licona offered outpatient thoracentesis versus admission to the hospital for pigtail catheter placement and talc pleurodesis.  The hospital is full and patient is on outpatient pending list however after speaking to patient he is having increasingly worsening shortness of breath and he was advised to go to the ER.  He verbalizes understanding.

## 2024-12-17 ENCOUNTER — APPOINTMENT (OUTPATIENT)
Dept: CT IMAGING | Facility: HOSPITAL | Age: 64
End: 2024-12-17
Payer: MEDICARE

## 2024-12-17 ENCOUNTER — APPOINTMENT (OUTPATIENT)
Dept: GENERAL RADIOLOGY | Facility: HOSPITAL | Age: 64
End: 2024-12-17
Payer: MEDICARE

## 2024-12-17 PROCEDURE — 71045 X-RAY EXAM CHEST 1 VIEW: CPT

## 2024-12-17 PROCEDURE — 99232 SBSQ HOSP IP/OBS MODERATE 35: CPT | Performed by: SURGERY

## 2024-12-17 PROCEDURE — 71250 CT THORAX DX C-: CPT

## 2024-12-17 RX ADMIN — Medication 10 ML: at 09:03

## 2024-12-17 RX ADMIN — ACETAMINOPHEN 650 MG: 325 TABLET ORAL at 09:02

## 2024-12-17 RX ADMIN — AMLODIPINE BESYLATE 5 MG: 5 TABLET ORAL at 09:02

## 2024-12-17 RX ADMIN — LISINOPRIL 20 MG: 20 TABLET ORAL at 09:02

## 2024-12-17 RX ADMIN — ACETAMINOPHEN 650 MG: 325 TABLET ORAL at 20:42

## 2024-12-17 RX ADMIN — Medication 10 ML: at 20:43

## 2024-12-17 NOTE — PLAN OF CARE
Goal Outcome Evaluation:  Plan of Care Reviewed With: patient        Progress: improving    Outcome Evaluation: VSS, pigtail catheter flush to night at 20;00 and 03:30 this morning, pt states he is feeling alot better.

## 2024-12-17 NOTE — PROGRESS NOTES
"Patient name: Issac Back  Patient : 1960  VISIT # [id number]  MR #[id number]    Procedure:  Procedure Date:  POD:* No surgery found *    Subjective   Chief Complaint   Patient presents with    Shortness of Breath     On room air.  Reports much improvement in breathing.  Right pigtail catheter remains in place to 20 cm suction.    ROS: No fevers or chills.  Shortness of breath improved.  No chest pain.       Objective     Visit Vitals  /74 (BP Location: Left arm, Patient Position: Sitting)   Pulse 59   Temp 98.1 °F (36.7 °C) (Oral)   Resp 16   Ht 180.3 cm (71\")   Wt 115 kg (254 lb 4 oz)   SpO2 97%   BMI 35.46 kg/m²       Intake/Output Summary (Last 24 hours) at 2024 0706  Last data filed at 2024 0322  Gross per 24 hour   Intake 240 ml   Output 2220 ml   Net -1980 ml     Right pigtail: 1770 mL in 24 hours, serous, no airleak    Lab:     CBC:  Results from last 7 days   Lab Units 24  1032   WBC 10*3/mm3 8.90   HEMATOCRIT % 42.8   PLATELETS 10*3/mm3 219          BMP:  Results from last 7 days   Lab Units 24  1032   SODIUM mmol/L 140   POTASSIUM mmol/L 3.3*   CHLORIDE mmol/L 102   CO2 mmol/L 28.0   GLUCOSE mg/dL 155*   BUN mg/dL 18   CREATININE mg/dL 0.86          COAG:      Invalid input(s): \"PT\"    IMAGES:       Imaging Results (Last 24 Hours)       Procedure Component Value Units Date/Time    XR Chest 1 View [[id number]] Collected: 24     Updated: 24    Narrative:      EXAMINATION: XR CHEST 1 VW-     2024 2:30 AM     HISTORY: right pleural effusion; R06.00-Dyspnea, unspecified;  D75-Virqxin effusion, not elsewhere classified     A frontal projection of the chest is compared with the previous study  dated 2024.     The lungs are poorly expanded.     There are atelectatic changes more pronounced in the lower lungs, right  more than the left similar to the previous study.     A small caliber pleural catheter is seen at the right base " laterally  similar to the previous study.     Mild pulmonary venous congestion persist.     There is no pleural effusion. There is no pneumothorax.     There is moderate cardiomegaly.     No acute bony abnormality.       Impression:      1. Mild pulmonary venous congestion and moderate cardiomegaly. No  pleural effusion or pneumothorax. Right lower chest pleural catheter in  place.              This report was signed and finalized on 12/17/2024 6:36 AM by Dr. Armida Anders MD.       XR Chest 1 View [[id number]] Collected: 12/16/24 1305     Updated: 12/16/24 1310    Narrative:      EXAM/TECHNIQUE: XR CHEST 1 VW-     INDICATION: post chest tube insertion; R06.00-Dyspnea, unspecified;  M73-Btooxuj effusion, not elsewhere classified     COMPARISON: 12/16/2024 1048     FINDINGS:     Interval placement of right basilar chest tube. No significant residual  pleural effusion.     No change in mild bibasilar atelectasis. Mid and upper lung zones are  clear. No pneumothorax. Cardiac silhouette is enlarged but stable. No  acute osseous finding.       Impression:         1.  Interval placement of right basilar chest tube. No significant  residual pleural effusion.  2.  Mild bibasilar atelectasis.     This report was signed and finalized on 12/16/2024 1:07 PM by Dr. Giancarlo Laird MD.       XR Chest 1 View [[id number]] Collected: 12/16/24 1128     Updated: 12/16/24 1133    Narrative:      XR CHEST 1 VW- 12/16/2024 10:19 AM     HISTORY: Shortness of air       COMPARISON: 11/15/2024     FINDINGS:  Upright frontal radiograph of the chest was obtained     There is some patchy airspace disease in the right lung base with what  appears to be a trace pleural effusion. This is less prominent when  compared to the recent comparison chest x-rays from November. The  cardiomediastinal silhouette and pulmonary vascularity are within normal  limits. The osseous structures and surrounding soft tissues demonstrate  no acute abnormality.        Impression:      1.  Right lower lobe airspace disease and trace right-sided pleural  effusion which appear to be decreasing when compared to the recent  comparison chest x-ray from November.     This report was signed and finalized on 12/16/2024 11:30 AM by Dr Eber Ascencio.             CXR: Right pigtail catheter in stable position with no pneumothorax.  Resolution of right-sided basilar pleural effusion.    Physical Exam:  General: Alert, oriented. No apparent distress.  Obese  Cardiovascular: Regular rate and rhythm without murmur, rubs, or gallops.    Pulmonary: Clear to auscultation bilaterally without wheezing, rubs, or rales.  Chest: Right chest tube to 20 cm suction. No air leak. Fluid is serous  Abdomen: Soft, nondistended, and nontender.  Extremities: Warm, moves all extremities. No edema.   Neurologic:  Grossly intact with no focal deficits.            Impression:  Recurrent right pleural effusion  Shortness of breath  Obesity BMI 35          Plan:  Continue right pigtail catheter to 20 cm suction  Nursing to flush right pigtail catheter with 10 mL normal saline every 8 hours to maintain tube patency  Daily chest x-ray  CT Chest without contrast this morning  Possible talc pleurodesis tomorrow  Avoid Tegaderm dressings, changed chest tube to 4 x 4 and tape dressing  Incentive spirometer  Discussed with patient and nursing      ROLAND Menard  12/17/24  07:06 CST

## 2024-12-18 ENCOUNTER — APPOINTMENT (OUTPATIENT)
Dept: GENERAL RADIOLOGY | Facility: HOSPITAL | Age: 64
End: 2024-12-18
Payer: MEDICARE

## 2024-12-18 LAB
QT INTERVAL: 366 MS
QTC INTERVAL: 403 MS

## 2024-12-18 PROCEDURE — 71045 X-RAY EXAM CHEST 1 VIEW: CPT

## 2024-12-18 PROCEDURE — 25010000002 FUROSEMIDE PER 20 MG: Performed by: NURSE PRACTITIONER

## 2024-12-18 PROCEDURE — 99232 SBSQ HOSP IP/OBS MODERATE 35: CPT | Performed by: SURGERY

## 2024-12-18 RX ORDER — BENZOCAINE/MENTHOL 6 MG-10 MG
1 LOZENGE MUCOUS MEMBRANE EVERY 6 HOURS PRN
Status: DISCONTINUED | OUTPATIENT
Start: 2024-12-18 | End: 2024-12-24 | Stop reason: HOSPADM

## 2024-12-18 RX ORDER — FUROSEMIDE 10 MG/ML
20 INJECTION INTRAMUSCULAR; INTRAVENOUS ONCE
Status: COMPLETED | OUTPATIENT
Start: 2024-12-18 | End: 2024-12-18

## 2024-12-18 RX ADMIN — LISINOPRIL 20 MG: 20 TABLET ORAL at 09:04

## 2024-12-18 RX ADMIN — Medication 10 ML: at 09:05

## 2024-12-18 RX ADMIN — AMLODIPINE BESYLATE 5 MG: 5 TABLET ORAL at 09:04

## 2024-12-18 RX ADMIN — HYDROCORTISONE 1 APPLICATION: 1 CREAM TOPICAL at 22:02

## 2024-12-18 RX ADMIN — FUROSEMIDE 20 MG: 10 INJECTION, SOLUTION INTRAMUSCULAR; INTRAVENOUS at 09:04

## 2024-12-18 RX ADMIN — ACETAMINOPHEN 650 MG: 325 TABLET ORAL at 06:11

## 2024-12-18 RX ADMIN — Medication 10 ML: at 19:29

## 2024-12-18 NOTE — PLAN OF CARE
Problem: Adult Inpatient Plan of Care  Goal: Plan of Care Review  Outcome: Progressing  Goal: Patient-Specific Goal (Individualized)  Outcome: Progressing  Goal: Absence of Hospital-Acquired Illness or Injury  Outcome: Progressing  Intervention: Identify and Manage Fall Risk  Recent Flowsheet Documentation  Taken 12/18/2024 0200 by Jennifer Castro, RN  Safety Promotion/Fall Prevention: safety round/check completed  Taken 12/18/2024 0000 by Jennifer Castro RN  Safety Promotion/Fall Prevention: safety round/check completed  Taken 12/17/2024 2200 by Jennifer Castro RN  Safety Promotion/Fall Prevention: safety round/check completed  Taken 12/17/2024 1948 by Jennifer Castro RN  Safety Promotion/Fall Prevention: safety round/check completed  Intervention: Prevent Skin Injury  Recent Flowsheet Documentation  Taken 12/17/2024 1948 by Jennifer Castro, RN  Body Position: position changed independently  Goal: Optimal Comfort and Wellbeing  Outcome: Progressing  Goal: Readiness for Transition of Care  Outcome: Progressing     Problem: Comorbidity Management  Goal: Blood Pressure in Desired Range  Outcome: Progressing   Goal Outcome Evaluation:

## 2024-12-18 NOTE — PROGRESS NOTES
"Patient name: Issac Back  Patient : 1960  VISIT # [id number]  MR #[id number]    Procedure:  Procedure Date:  POD:* No surgery found *    Subjective   Chief Complaint   Patient presents with    Shortness of Breath     On room air.  Reports much improvement in breathing.  Right pigtail catheter remains in place to 20 cm suction.    ROS: No fevers or chills.  Shortness of breath improved.  No chest pain.       Objective     Visit Vitals  /76 (BP Location: Left arm, Patient Position: Sitting)   Pulse 65   Temp 97.8 °F (36.6 °C) (Oral)   Resp 20   Ht 180.3 cm (71\")   Wt 115 kg (254 lb 4 oz)   SpO2 97%   BMI 35.46 kg/m²       Intake/Output Summary (Last 24 hours) at 2024  Last data filed at 2024 0600  Gross per 24 hour   Intake 460 ml   Output 1995 ml   Net -1535 ml     Right pigtail: 455 mL in 24 hours, serous, no airleak    Lab:     CBC:  Results from last 7 days   Lab Units 24  1032   WBC 10*3/mm3 8.90   HEMATOCRIT % 42.8   PLATELETS 10*3/mm3 219          BMP:  Results from last 7 days   Lab Units 24  1032   SODIUM mmol/L 140   POTASSIUM mmol/L 3.3*   CHLORIDE mmol/L 102   CO2 mmol/L 28.0   GLUCOSE mg/dL 155*   BUN mg/dL 18   CREATININE mg/dL 0.86          COAG:      Invalid input(s): \"PT\"    IMAGES:       Imaging Results (Last 24 Hours)       Procedure Component Value Units Date/Time    XR Chest 1 View [[id number]] Collected: 24     Updated: 24    Narrative:      EXAMINATION: XR CHEST 1 VW-     2024 2:40 AM     HISTORY: right pleural effusion; R06.00-Dyspnea, unspecified;  G38-Bbuqosc effusion, not elsewhere classified     A frontal projection of the chest is compared with the previous study  dated 2024.     The lungs are moderately well-expanded.     Pulmonary venous congestion has resolved.     A small pleural catheter at the right base laterally is reidentified  with no change in position since the previous study.     There is no pleural " effusion. No pneumothorax.     No recent infiltrate.     There is moderate cardiomegaly.     No acute bony abnormality.       Impression:      1. The resolution of the pulmonary venous congestion since the previous  study. No active cardiopulmonary disease in the present study. Right  basal pleural catheter in place. No change.        This report was signed and finalized on 12/18/2024 7:09 AM by Dr. Armida Anders MD.       CT Chest Without Contrast Diagnostic [[id number]] Collected: 12/17/24 0807     Updated: 12/17/24 0820    Narrative:      EXAM/TECHNIQUE: CT chest without contrast     INDICATION: pleural effusion; R06.00-Dyspnea, unspecified; O05-Sdmyktv  effusion, not elsewhere classified     COMPARISON: 12/5/2024     DLP: 392.74 mGy.cm. Automated exposure control was utilized to decrease  patient radiation dose.     FINDINGS:     Small-caliber right posterolateral inferior pleural drain is in place.  Trace residual right-sided pleural effusion. Tiny amount of right-sided  pleural gas noted anteriorly.     Postoperative change of right posterior lateral 10th rib lesion  resection. At the site of lesion resection, there is an 8.7 x 4.0 x 8.2  cm partially organized fluid collection in the subcutaneous soft tissues  of the right chest wall. There is a few scattered locules of gas within  this collection. This collection may communicate with the right pleural  space.     There is new right lower lobe consolidation and groundglass opacity.     The central airways are clear. The left lung and pleural space are  clear. No advanced emphysematous or fibrotic change. No suspicious  pulmonary nodule. No pneumothorax.     No enlarged noncalcified thoracic lymph nodes. The main pulmonary artery  is nondilated. The thoracic aorta is upper limits of normal in caliber.  No pericardial effusion. Mild coronary artery calcification.     No large thyroid nodule. Hepatic steatosis. Prior cholecystectomy.  Mesenteric  panniculitis. No acute osseous finding.       Impression:         1.  Small right posterior pleural drain in place. Trace right-sided  pleural effusion. Tiny amount of right pleural gas noted anteriorly.     2.  Postoperative change of right posterolateral 10th rib mass  resection. 8.7 x 4.0 x 8.2 cm partially organized fluid collection at  the site of resection, which may communicate with the right pleural  fluid. Differential diagnosis also includes a an organized infected  fluid collection as well as a postoperative hematoma/seroma.     3.  New right lower lobe consolidation and groundglass opacity, with  differential including infectious process/pneumonia.     This report was signed and finalized on 12/17/2024 8:17 AM by Dr. Giancarlo Laird MD.             CXR: Right pigtail catheter in stable position with no pneumothorax.  Resolution of right-sided basilar pleural effusion.    Physical Exam:  General: Alert, oriented. No apparent distress.  Obese  Cardiovascular: Regular rate and rhythm without murmur, rubs, or gallops.    Pulmonary: Clear to auscultation bilaterally without wheezing, rubs, or rales.  Chest: Right chest tube to 20 cm suction. No air leak. Fluid is serous  Abdomen: Soft, nondistended, and nontender.  Extremities: Warm, moves all extremities. No edema.   Neurologic:  Grossly intact with no focal deficits.            Impression:  Recurrent right pleural effusion  Shortness of breath  Obesity BMI 35          Plan:  Continue right pigtail catheter to 20 cm suction  Nursing to flush right pigtail catheter with 10 mL normal saline every 8 hours to maintain tube patency  Daily chest x-ray  Possible talc pleurodesis tomorrow when chest tube output decreases  Lasix 20 mg IV x 1 dose today  Repeat labs tomorrow  Avoid Tegaderm dressings, changed chest tube to 4 x 4 and tape dressing  Incentive spirometer  Discussed with patient and nursing      Fifi Brown, ROLAND  12/18/24  08:13 CST

## 2024-12-19 ENCOUNTER — APPOINTMENT (OUTPATIENT)
Dept: GENERAL RADIOLOGY | Facility: HOSPITAL | Age: 64
End: 2024-12-19
Payer: MEDICARE

## 2024-12-19 LAB
ANION GAP SERPL CALCULATED.3IONS-SCNC: 9 MMOL/L (ref 5–15)
BUN SERPL-MCNC: 16 MG/DL (ref 8–23)
BUN/CREAT SERPL: 17.8 (ref 7–25)
CALCIUM SPEC-SCNC: 8.7 MG/DL (ref 8.6–10.5)
CHLORIDE SERPL-SCNC: 100 MMOL/L (ref 98–107)
CO2 SERPL-SCNC: 29 MMOL/L (ref 22–29)
CREAT SERPL-MCNC: 0.9 MG/DL (ref 0.76–1.27)
DEPRECATED RDW RBC AUTO: 37.6 FL (ref 37–54)
EGFRCR SERPLBLD CKD-EPI 2021: 94.2 ML/MIN/1.73
ERYTHROCYTE [DISTWIDTH] IN BLOOD BY AUTOMATED COUNT: 12.3 % (ref 12.3–15.4)
GLUCOSE SERPL-MCNC: 118 MG/DL (ref 65–99)
HCT VFR BLD AUTO: 43.7 % (ref 37.5–51)
HGB BLD-MCNC: 14.4 G/DL (ref 13–17.7)
MCH RBC QN AUTO: 27.4 PG (ref 26.6–33)
MCHC RBC AUTO-ENTMCNC: 33 G/DL (ref 31.5–35.7)
MCV RBC AUTO: 83.2 FL (ref 79–97)
PLATELET # BLD AUTO: 242 10*3/MM3 (ref 140–450)
PMV BLD AUTO: 11.8 FL (ref 6–12)
POTASSIUM SERPL-SCNC: 3.5 MMOL/L (ref 3.5–5.2)
RBC # BLD AUTO: 5.25 10*6/MM3 (ref 4.14–5.8)
SODIUM SERPL-SCNC: 138 MMOL/L (ref 136–145)
WBC NRBC COR # BLD AUTO: 10.57 10*3/MM3 (ref 3.4–10.8)

## 2024-12-19 PROCEDURE — 80048 BASIC METABOLIC PNL TOTAL CA: CPT | Performed by: NURSE PRACTITIONER

## 2024-12-19 PROCEDURE — 25010000002 FUROSEMIDE PER 20 MG: Performed by: NURSE PRACTITIONER

## 2024-12-19 PROCEDURE — 85027 COMPLETE CBC AUTOMATED: CPT | Performed by: NURSE PRACTITIONER

## 2024-12-19 PROCEDURE — 71045 X-RAY EXAM CHEST 1 VIEW: CPT

## 2024-12-19 PROCEDURE — 99232 SBSQ HOSP IP/OBS MODERATE 35: CPT | Performed by: SURGERY

## 2024-12-19 RX ORDER — FUROSEMIDE 10 MG/ML
20 INJECTION INTRAMUSCULAR; INTRAVENOUS
Status: COMPLETED | OUTPATIENT
Start: 2024-12-19 | End: 2024-12-19

## 2024-12-19 RX ADMIN — Medication 10 ML: at 21:48

## 2024-12-19 RX ADMIN — IBUPROFEN 400 MG: 800 TABLET, FILM COATED ORAL at 08:33

## 2024-12-19 RX ADMIN — AMLODIPINE BESYLATE 5 MG: 5 TABLET ORAL at 08:28

## 2024-12-19 RX ADMIN — Medication 10 ML: at 08:34

## 2024-12-19 RX ADMIN — LISINOPRIL 20 MG: 20 TABLET ORAL at 08:28

## 2024-12-19 RX ADMIN — FUROSEMIDE 20 MG: 10 INJECTION, SOLUTION INTRAMUSCULAR; INTRAVENOUS at 08:31

## 2024-12-19 RX ADMIN — FUROSEMIDE 20 MG: 10 INJECTION, SOLUTION INTRAMUSCULAR; INTRAVENOUS at 17:15

## 2024-12-19 NOTE — PROGRESS NOTES
"Patient name: Issac Back  Patient : 1960  VISIT # [id number]  MR #[id number]    Procedure:  Procedure Date:  POD:* No surgery found *    Subjective   Chief Complaint   Patient presents with    Shortness of Breath     On room air.  Reports much improvement in breathing.  Right pigtail catheter remains in place; however, twisted this AM. Dr. Licona adjusted pig tail, untwisted, and flushed. Serous drainage noted. Nursing notes reviewed and notes raised itchy rash to right upper back area under tape, treated with cortisone cream, and different type dressing.     ROS: No fevers or chills.  Shortness of breath improved.  No chest pain.       Objective     Visit Vitals  /71 (BP Location: Right arm, Patient Position: Sitting)   Pulse 63   Temp 97.7 °F (36.5 °C) (Oral)   Resp 18   Ht 180.3 cm (71\")   Wt 115 kg (254 lb 4 oz)   SpO2 98%   BMI 35.46 kg/m²       Intake/Output Summary (Last 24 hours) at 2024 0817  Last data filed at 2024 0809  Gross per 24 hour   Intake 1440 ml   Output 1890 ml   Net -450 ml     Right pigtail: 290 mL in 24 hours, serous, no airleak    Lab:     CBC:  Results from last 7 days   Lab Units 24  0158 24  1032   WBC 10*3/mm3 10.57 8.90   HEMATOCRIT % 43.7 42.8   PLATELETS 10*3/mm3 242 219          BMP:  Results from last 7 days   Lab Units 24  0158 24  1032   SODIUM mmol/L 138 140   POTASSIUM mmol/L 3.5 3.3*   CHLORIDE mmol/L 100 102   CO2 mmol/L 29.0 28.0   GLUCOSE mg/dL 118* 155*   BUN mg/dL 16 18   CREATININE mg/dL 0.90 0.86          COAG:      Invalid input(s): \"PT\"    IMAGES:       Imaging Results (Last 24 Hours)       Procedure Component Value Units Date/Time    XR Chest 1 View [[id number]] Collected: 24     Updated: 24    Narrative:      EXAMINATION: XR CHEST 1 VW-     2024 2:44 AM     HISTORY: right pleural effusion; R06.00-Dyspnea, unspecified;  N37-Axiclqd effusion, not elsewhere classified     The frontal projection " of the chest is compared with the previous study  dated 12/18/2024.     The lungs are moderately well-expanded.     There are mild atelectatic changes in the left lower lung.     There is no pleural effusion, pulmonary congestion or pneumothorax.     The right basal small caliber chest catheter is in place. No change.     There is moderate cardiomegaly.     No acute bony abnormality.       Impression:      1. Well-expanded lungs with mild atelectatic change at the left base. No  active cardiopulmonary disease. A small caliber pleural catheter at the  right base without change in position since the previous study              This report was signed and finalized on 12/19/2024 6:52 AM by Dr. Armida Anders MD.             CXR: Right pigtail catheter in place. No effusion or consolidation.     Physical Exam:  General: Alert, oriented. No apparent distress.  Obese  Cardiovascular: Regular rate and rhythm without murmur, rubs, or gallops.    Pulmonary: Clear to auscultation bilaterally without wheezing, rubs, or rales.  Chest: Right chest tube to 20 cm suction. No air leak. Fluid is serous  Abdomen: Soft, nondistended, and nontender.  Extremities: Warm, moves all extremities. No edema.   Neurologic:  Grossly intact with no focal deficits.            Impression:  Recurrent right pleural effusion  Shortness of breath  Obesity BMI 35      Plan:  Continue right pigtail catheter to 20 cm suction  Nursing to flush right pigtail catheter with 10 mL normal saline every 8 hours to maintain tube patency  Daily chest x-ray  Possible talc pleurodesis tomorrow when chest tube output decreases  Lasix 20 mg IV x 2 doses today  Repeat labs tomorrow  Avoid Tegaderm dressings  Incentive spirometer  Discussed with patient and nursing      Fifi Mills, ROLAND  12/19/24  08:17 CST

## 2024-12-19 NOTE — PLAN OF CARE
Goal Outcome Evaluation:  Plan of Care Reviewed With: patient      CT flushed q 8. Large raised itchy and red tape rash on upper right back treated with cortisone cream and pt stated this was very helpful. No pain. VSS, CTM  Progress: improving

## 2024-12-20 ENCOUNTER — APPOINTMENT (OUTPATIENT)
Dept: GENERAL RADIOLOGY | Facility: HOSPITAL | Age: 64
End: 2024-12-20
Payer: MEDICARE

## 2024-12-20 LAB
ANION GAP SERPL CALCULATED.3IONS-SCNC: 11 MMOL/L (ref 5–15)
BUN SERPL-MCNC: 16 MG/DL (ref 8–23)
BUN/CREAT SERPL: 19 (ref 7–25)
CALCIUM SPEC-SCNC: 8.8 MG/DL (ref 8.6–10.5)
CHLORIDE SERPL-SCNC: 100 MMOL/L (ref 98–107)
CO2 SERPL-SCNC: 27 MMOL/L (ref 22–29)
CREAT SERPL-MCNC: 0.84 MG/DL (ref 0.76–1.27)
DEPRECATED RDW RBC AUTO: 37 FL (ref 37–54)
EGFRCR SERPLBLD CKD-EPI 2021: 96.2 ML/MIN/1.73
ERYTHROCYTE [DISTWIDTH] IN BLOOD BY AUTOMATED COUNT: 12.2 % (ref 12.3–15.4)
GLUCOSE SERPL-MCNC: 123 MG/DL (ref 65–99)
HCT VFR BLD AUTO: 42.2 % (ref 37.5–51)
HGB BLD-MCNC: 13.8 G/DL (ref 13–17.7)
MCH RBC QN AUTO: 27 PG (ref 26.6–33)
MCHC RBC AUTO-ENTMCNC: 32.7 G/DL (ref 31.5–35.7)
MCV RBC AUTO: 82.6 FL (ref 79–97)
PLATELET # BLD AUTO: 239 10*3/MM3 (ref 140–450)
PMV BLD AUTO: 11.3 FL (ref 6–12)
POTASSIUM SERPL-SCNC: 3.8 MMOL/L (ref 3.5–5.2)
RBC # BLD AUTO: 5.11 10*6/MM3 (ref 4.14–5.8)
SODIUM SERPL-SCNC: 138 MMOL/L (ref 136–145)
WBC NRBC COR # BLD AUTO: 10.4 10*3/MM3 (ref 3.4–10.8)

## 2024-12-20 PROCEDURE — 80048 BASIC METABOLIC PNL TOTAL CA: CPT | Performed by: NURSE PRACTITIONER

## 2024-12-20 PROCEDURE — 85027 COMPLETE CBC AUTOMATED: CPT | Performed by: NURSE PRACTITIONER

## 2024-12-20 PROCEDURE — 71045 X-RAY EXAM CHEST 1 VIEW: CPT

## 2024-12-20 PROCEDURE — 25010000002 FUROSEMIDE PER 20 MG: Performed by: NURSE PRACTITIONER

## 2024-12-20 RX ORDER — FUROSEMIDE 10 MG/ML
20 INJECTION INTRAMUSCULAR; INTRAVENOUS
Status: DISCONTINUED | OUTPATIENT
Start: 2024-12-20 | End: 2024-12-22

## 2024-12-20 RX ORDER — POTASSIUM CHLORIDE 750 MG/1
20 CAPSULE, EXTENDED RELEASE ORAL 2 TIMES DAILY WITH MEALS
Status: DISCONTINUED | OUTPATIENT
Start: 2024-12-20 | End: 2024-12-24 | Stop reason: HOSPADM

## 2024-12-20 RX ADMIN — POTASSIUM CHLORIDE 20 MEQ: 750 CAPSULE, EXTENDED RELEASE ORAL at 18:10

## 2024-12-20 RX ADMIN — Medication 10 ML: at 22:41

## 2024-12-20 RX ADMIN — FUROSEMIDE 20 MG: 10 INJECTION, SOLUTION INTRAMUSCULAR; INTRAVENOUS at 18:10

## 2024-12-20 RX ADMIN — AMLODIPINE BESYLATE 5 MG: 5 TABLET ORAL at 08:49

## 2024-12-20 RX ADMIN — FUROSEMIDE 20 MG: 10 INJECTION, SOLUTION INTRAMUSCULAR; INTRAVENOUS at 10:22

## 2024-12-20 RX ADMIN — LISINOPRIL 20 MG: 20 TABLET ORAL at 08:49

## 2024-12-20 RX ADMIN — POTASSIUM CHLORIDE 20 MEQ: 750 CAPSULE, EXTENDED RELEASE ORAL at 10:22

## 2024-12-20 RX ADMIN — Medication 10 ML: at 08:49

## 2024-12-20 NOTE — NURSING NOTE
Patient removed electrodes due to adhesive irritating skin. Staff offered hypoallergenic electrodes to continue telemetry. Patient refused, refusal of care form signed and in chart.   Yes

## 2024-12-20 NOTE — PLAN OF CARE
Goal Outcome Evaluation:  Plan of Care Reviewed With: patient        Progress: no change  Outcome Evaluation: No co pain. Pigtail flush every 8 hours. He had good output. Lasix given. Possible Talc soon. Cont to monitor.

## 2024-12-20 NOTE — PROGRESS NOTES
"Patient name: Issac Back  Patient : 1960  VISIT # [id number]  MR #[id number]    Procedure:  Procedure Date:  POD:* No surgery found *    Subjective   Chief Complaint   Patient presents with    Shortness of Breath     On room air.  Reports much improvement in breathing.  Right pigtail catheter remains in place with large volume output.  He is having issues with rash due to adhesive from tape and chest tube dressing site as well as telemetry electrode adhesive.    ROS: No fevers or chills.  Shortness of breath improved.  No chest pain.       Objective     Visit Vitals  /84 (BP Location: Right arm, Patient Position: Sitting) Comment: RN notified   Pulse 75   Temp 98.4 °F (36.9 °C) (Oral)   Resp 18   Ht 180.3 cm (71\")   Wt 115 kg (254 lb 4 oz)   SpO2 94%   BMI 35.46 kg/m²       Intake/Output Summary (Last 24 hours) at 2024 0735  Last data filed at 2024 0352  Gross per 24 hour   Intake 1620 ml   Output 1850 ml   Net -230 ml     Right pigtail: 550 mL in 24 hours, serous, no airleak    Lab:     CBC:  Results from last 7 days   Lab Units 24  0357 24  0158 24  1032   WBC 10*3/mm3 10.40 10.57 8.90   HEMATOCRIT % 42.2 43.7 42.8   PLATELETS 10*3/mm3 239 242 219          BMP:  Results from last 7 days   Lab Units 24  0357 24  0158 24  1032   SODIUM mmol/L 138 138 140   POTASSIUM mmol/L 3.8 3.5 3.3*   CHLORIDE mmol/L 100 100 102   CO2 mmol/L 27.0 29.0 28.0   GLUCOSE mg/dL 123* 118* 155*   BUN mg/dL 16 16 18   CREATININE mg/dL 0.84 0.90 0.86          COAG:      Invalid input(s): \"PT\"    IMAGES:       Imaging Results (Last 24 Hours)       Procedure Component Value Units Date/Time    XR Chest 1 View [[id number]] Collected: 24     Updated: 24    Narrative:      EXAMINATION: XR CHEST 1 VW-     2024 2:30 AM     HISTORY: right pleural effusion; R06.00-Dyspnea, unspecified;  E51-Wbwkyuw effusion, not elsewhere classified     1 view chest x-ray.   "   COMPARISON:  Yesterday at 3:34 a.m.     Magnified heart size.  Normal mediastinum.     Adequately expanded lungs.  Mild chronic prominence of lung markings.     No focal infiltrate.     A small-caliber pleural catheter is again seen projecting over the RIGHT  lung base.  A CT exam from 3 days ago showed infiltrate within the posterior RIGHT  lung base.  This likely persists given the appearance of the medial RIGHT lung base  and dense appearance of the RIGHT diaphragm.  No pneumothorax.  No significant pleural fluid is seen.       Impression:      1. Stable chest x-ray appearance compared with yesterday.  2. Probable persistent RIGHT basilar consolidation.           This report was signed and finalized on 12/20/2024 6:54 AM by Dr. Mir Love MD.             CXR: Right pigtail catheter in place. No effusion or consolidation.  Stable exam    Physical Exam:  General: Alert, oriented. No apparent distress.  Obese  Cardiovascular: Regular rate and rhythm without murmur, rubs, or gallops.    Pulmonary: Clear to auscultation bilaterally without wheezing, rubs, or rales.  Chest: Right chest tube to 20 cm suction. No air leak. Fluid is serous  Abdomen: Soft, nondistended, and nontender.  Extremities: Warm, moves all extremities. No edema.   Neurologic:  Grossly intact with no focal deficits.            Impression:  Recurrent right pleural effusion  Shortness of breath  Obesity BMI 35      Plan:  Continue right pigtail catheter to 20 cm suction  Nursing to flush right pigtail catheter with 10 mL normal saline every 8 hours to maintain tube patency  Daily chest x-ray  Possible talc pleurodesis tomorrow when chest tube output decreases, too high today to proceed with talc.  Continue twice daily Lasix  Daily BMP  Avoid Tegaderm dressings  Incentive spirometer  Discussed with patient and nursing      ROLAND Menard  12/20/24  07:35 CST

## 2024-12-20 NOTE — PLAN OF CARE
Goal Outcome Evaluation:      Chest tube output 70 mLs. Normal sinus rhythm with PVCs per tele room. On room air. No c/o SOA.

## 2024-12-21 ENCOUNTER — APPOINTMENT (OUTPATIENT)
Dept: GENERAL RADIOLOGY | Facility: HOSPITAL | Age: 64
End: 2024-12-21
Payer: MEDICARE

## 2024-12-21 LAB
ANION GAP SERPL CALCULATED.3IONS-SCNC: 10 MMOL/L (ref 5–15)
BACTERIA SPEC AEROBE CULT: NORMAL
BACTERIA SPEC AEROBE CULT: NORMAL
BUN SERPL-MCNC: 17 MG/DL (ref 8–23)
BUN/CREAT SERPL: 17.3 (ref 7–25)
CALCIUM SPEC-SCNC: 9.1 MG/DL (ref 8.6–10.5)
CHLORIDE SERPL-SCNC: 102 MMOL/L (ref 98–107)
CO2 SERPL-SCNC: 29 MMOL/L (ref 22–29)
CREAT SERPL-MCNC: 0.98 MG/DL (ref 0.76–1.27)
EGFRCR SERPLBLD CKD-EPI 2021: 85 ML/MIN/1.73
GLUCOSE SERPL-MCNC: 122 MG/DL (ref 65–99)
POTASSIUM SERPL-SCNC: 3.6 MMOL/L (ref 3.5–5.2)
SODIUM SERPL-SCNC: 141 MMOL/L (ref 136–145)

## 2024-12-21 PROCEDURE — 25010000002 FUROSEMIDE PER 20 MG: Performed by: NURSE PRACTITIONER

## 2024-12-21 PROCEDURE — 71045 X-RAY EXAM CHEST 1 VIEW: CPT

## 2024-12-21 PROCEDURE — 80048 BASIC METABOLIC PNL TOTAL CA: CPT | Performed by: NURSE PRACTITIONER

## 2024-12-21 RX ADMIN — Medication 10 ML: at 21:19

## 2024-12-21 RX ADMIN — POTASSIUM CHLORIDE 20 MEQ: 750 CAPSULE, EXTENDED RELEASE ORAL at 18:10

## 2024-12-21 RX ADMIN — FUROSEMIDE 20 MG: 10 INJECTION, SOLUTION INTRAMUSCULAR; INTRAVENOUS at 09:19

## 2024-12-21 RX ADMIN — Medication 10 ML: at 09:19

## 2024-12-21 RX ADMIN — FUROSEMIDE 20 MG: 10 INJECTION, SOLUTION INTRAMUSCULAR; INTRAVENOUS at 18:10

## 2024-12-21 RX ADMIN — POTASSIUM CHLORIDE 20 MEQ: 750 CAPSULE, EXTENDED RELEASE ORAL at 09:19

## 2024-12-21 RX ADMIN — AMLODIPINE BESYLATE 5 MG: 5 TABLET ORAL at 09:19

## 2024-12-21 RX ADMIN — LISINOPRIL 20 MG: 20 TABLET ORAL at 09:18

## 2024-12-21 NOTE — PROGRESS NOTES
"Patient name: Issac Back  Patient : 1960  VISIT # [id number]  MR #[id number]    Procedure:  Procedure Date:  POD:* No surgery found *    Subjective   Chief Complaint   Patient presents with    Shortness of Breath       Afebrile.  On room air.  Chest tube output declined significantly.  Labs stable.  He is feeling much better overall       Objective     Visit Vitals  /67 (BP Location: Left arm, Patient Position: Sitting)   Pulse 72   Temp 98.4 °F (36.9 °C) (Oral)   Resp 16   Ht 180.3 cm (71\")   Wt 115 kg (254 lb 4 oz)   SpO2 94%   BMI 35.46 kg/m²       Intake/Output Summary (Last 24 hours) at 2024 0803  Last data filed at 2024 0349  Gross per 24 hour   Intake 1200 ml   Output 2135 ml   Net -935 ml     Right pigtail: 60 mL in 24 hours, serous, no airleak     LABS:    BMP  Glucose   Date/Time Value Ref Range Status   2024 0313 122 (H) 65 - 99 mg/dL Final     BUN   Date/Time Value Ref Range Status   2024 0313 17 8 - 23 mg/dL Final     Creatinine   Date/Time Value Ref Range Status   2024 0313 0.98 0.76 - 1.27 mg/dL Final     Sodium   Date/Time Value Ref Range Status   2024 0313 141 136 - 145 mmol/L Final     Potassium   Date/Time Value Ref Range Status   2024 0313 3.6 3.5 - 5.2 mmol/L Final     Chloride   Date/Time Value Ref Range Status   2024 0313 102 98 - 107 mmol/L Final     CO2   Date/Time Value Ref Range Status   2024 0313 29.0 22.0 - 29.0 mmol/L Final     Calcium   Date/Time Value Ref Range Status   2024 0313 9.1 8.6 - 10.5 mg/dL Final     BUN/Creatinine Ratio   Date/Time Value Ref Range Status   2024 0313 17.3 7.0 - 25.0 Final     Anion Gap   Date/Time Value Ref Range Status   2024 0313 10.0 5.0 - 15.0 mmol/L Final     eGFR   Date/Time Value Ref Range Status   2024 0313 85.0 >60.0 mL/min/1.73 Final     IMAGES:       Imaging Results (Last 24 Hours)       Procedure Component Value Units Date/Time    XR Chest 1 View " [[id number]] Resulted: 12/21/24 0418     Updated: 12/21/24 0419          My image interpretation: Right pigtail catheter on the right, improved aeration    Physical Exam:  General: Alert, oriented. No apparent distress.  Obese  Cardiovascular: Regular rate and rhythm without murmur, rubs, or gallops.    Pulmonary: Clear to auscultation bilaterally without wheezing, rubs, or rales.  Chest: Right chest tube to 20 cm suction. No air leak. Fluid is serous  Abdomen: Soft, nondistended, and nontender.  Extremities: Warm, moves all extremities. No edema.   Neurologic:  Grossly intact with no focal deficits.       Impression:  Recurrent right pleural effusion  Shortness of breath  Obesity BMI 35        Plan:  Plan for talc pleurodesis today   Continue right pigtail catheter to 20 cm suction   Nursing to flush right pigtail catheter with 10 mL normal saline every 8 hours to maintain tube patency  Daily chest x-ray  Diuresis   Avoid Tegaderm dressings  Discussed with patient and nursing      ROLAND Castillo  12/21/24  08:03 CST

## 2024-12-21 NOTE — PLAN OF CARE
Goal Outcome Evaluation:  Plan of Care Reviewed With: patient        Progress: improving  Outcome Evaluation: VSS. No complaints this shift. No output from chest tube. Pt states he feels much better. Possible Talc procedure today. Call light within reach. Safety maintained.

## 2024-12-22 ENCOUNTER — APPOINTMENT (OUTPATIENT)
Dept: CT IMAGING | Facility: HOSPITAL | Age: 64
End: 2024-12-22
Payer: MEDICARE

## 2024-12-22 ENCOUNTER — APPOINTMENT (OUTPATIENT)
Dept: GENERAL RADIOLOGY | Facility: HOSPITAL | Age: 64
End: 2024-12-22
Payer: MEDICARE

## 2024-12-22 PROCEDURE — 25510000001 IOPAMIDOL 61 % SOLUTION: Performed by: SURGERY

## 2024-12-22 PROCEDURE — 3E0L3GC INTRODUCTION OF OTHER THERAPEUTIC SUBSTANCE INTO PLEURAL CAVITY, PERCUTANEOUS APPROACH: ICD-10-PCS | Performed by: SURGERY

## 2024-12-22 PROCEDURE — 25010000002 FUROSEMIDE PER 20 MG: Performed by: NURSE PRACTITIONER

## 2024-12-22 PROCEDURE — 71045 X-RAY EXAM CHEST 1 VIEW: CPT

## 2024-12-22 PROCEDURE — 71260 CT THORAX DX C+: CPT

## 2024-12-22 RX ORDER — FUROSEMIDE 10 MG/ML
20 INJECTION INTRAMUSCULAR; INTRAVENOUS DAILY
Status: DISCONTINUED | OUTPATIENT
Start: 2024-12-23 | End: 2024-12-24 | Stop reason: HOSPADM

## 2024-12-22 RX ORDER — IOPAMIDOL 612 MG/ML
100 INJECTION, SOLUTION INTRAVASCULAR
Status: COMPLETED | OUTPATIENT
Start: 2024-12-22 | End: 2024-12-22

## 2024-12-22 RX ADMIN — IOPAMIDOL 100 ML: 612 INJECTION, SOLUTION INTRAVENOUS at 10:50

## 2024-12-22 RX ADMIN — ACETAMINOPHEN 650 MG: 325 TABLET ORAL at 22:28

## 2024-12-22 RX ADMIN — POTASSIUM CHLORIDE 20 MEQ: 750 CAPSULE, EXTENDED RELEASE ORAL at 18:47

## 2024-12-22 RX ADMIN — FUROSEMIDE 20 MG: 10 INJECTION, SOLUTION INTRAMUSCULAR; INTRAVENOUS at 09:53

## 2024-12-22 RX ADMIN — Medication 10 ML: at 09:54

## 2024-12-22 RX ADMIN — LISINOPRIL 20 MG: 20 TABLET ORAL at 09:53

## 2024-12-22 RX ADMIN — AMLODIPINE BESYLATE 5 MG: 5 TABLET ORAL at 09:53

## 2024-12-22 RX ADMIN — POTASSIUM CHLORIDE 20 MEQ: 750 CAPSULE, EXTENDED RELEASE ORAL at 09:53

## 2024-12-22 RX ADMIN — Medication 10 ML: at 20:33

## 2024-12-22 NOTE — PROGRESS NOTES
"Patient name: Issac Back  Patient : 1960  VISIT # [id number]  MR #[id number]    Procedure:  Procedure Date:  POD:* No surgery found *    Subjective   Chief Complaint   Patient presents with    Shortness of Breath       Afebrile.  On room air.  Talc pleurodesis not completed yesterday due to increased output.  He is feeling better overall.  Less discomfort.  Less short of breath.         Objective     Visit Vitals  /80 (BP Location: Left arm, Patient Position: Lying) Comment: RN notified   Pulse 67   Temp 98.1 °F (36.7 °C) (Oral)   Resp 18   Ht 180.3 cm (71\")   Wt 115 kg (254 lb 4 oz)   SpO2 96%   BMI 35.46 kg/m²       Intake/Output Summary (Last 24 hours) at 2024  Last data filed at 2024 0615  Gross per 24 hour   Intake 1160 ml   Output 2875 ml   Net -1715 ml     Right pigtail: 550 mL in 24 hours, serous, no airleak     LABS:    NONE    IMAGES:       Imaging Results (Last 24 Hours)       Procedure Component Value Units Date/Time    XR Chest 1 View [[id number]] Resulted: 24 0316     Updated: 24 0400    XR Chest 1 View [[id number]] Collected: 24     Updated: 24    Narrative:      EXAMINATION: XR CHEST 1 VW- 2024 9:10 AM     HISTORY: right pleural effusion; R06.00-Dyspnea, unspecified;  R72-Pniqvjm effusion, not elsewhere classified.     COMPARISON: Chest x-ray 2024 0345 hours.     REPORT:  A frontal view of the chest was obtained. There appears to be improved  aeration of the right lung base with less atelectasis. No pulmonary  consolidation. There is a small bore chest tube at the right lung base,  this appears in satisfactory position as before. No pneumothorax is  identified. The cardiac and mediastinal silhouettes are within normal  limits. The visualized bony thorax and upper abdomen are unremarkable.                                                                                                                                          "              Impression:             Improved aeration of the right lung base with decreased atelectasis, the  right basilar smallbore chest tube remains in good position. No  pneumothorax is identified..        This report was signed and finalized on 12/21/2024 9:12 AM by Dr. Chandra Fitzgerald MD.               My image interpretation: Chest tube on the right, atelectasis, worse on the right    Physical Exam:  General: Alert, oriented. No apparent distress.  Obese  Cardiovascular: Regular rate and rhythm without murmur, rubs, or gallops.    Pulmonary: Clear to auscultation bilaterally without wheezing, rubs, or rales.  Chest: Right chest tube to 20 cm suction. No air leak. Fluid is serous  Abdomen: Soft, nondistended, and nontender.  Extremities: Warm, moves all extremities. No edema.   Neurologic:  Grossly intact with no focal deficits.       Impression:  Recurrent right pleural effusion  Shortness of breath  Obesity BMI 35        Plan:  Continue right pigtail catheter to 20 cm suction   Nursing to flush right pigtail catheter with 10 mL normal saline every 8 hours to maintain tube patency  Daily chest x-ray  CT of the chest today ordered with contrast to assess fluid status.  If fluid status low, we will proceed with talc pleurodesis tomorrow  Reduce Lasix to once daily    Avoid Tegaderm dressings  Discussed with patient and nursing      Rubi Schumacher, ROLAND  12/22/24  06:28 CST

## 2024-12-23 ENCOUNTER — APPOINTMENT (OUTPATIENT)
Dept: GENERAL RADIOLOGY | Facility: HOSPITAL | Age: 64
End: 2024-12-23
Payer: MEDICARE

## 2024-12-23 LAB
ANION GAP SERPL CALCULATED.3IONS-SCNC: 10 MMOL/L (ref 5–15)
BUN SERPL-MCNC: 20 MG/DL (ref 8–23)
BUN/CREAT SERPL: 19.2 (ref 7–25)
CALCIUM SPEC-SCNC: 8.4 MG/DL (ref 8.6–10.5)
CHLORIDE SERPL-SCNC: 103 MMOL/L (ref 98–107)
CO2 SERPL-SCNC: 25 MMOL/L (ref 22–29)
CREAT SERPL-MCNC: 1.04 MG/DL (ref 0.76–1.27)
DEPRECATED RDW RBC AUTO: 37.4 FL (ref 37–54)
EGFRCR SERPLBLD CKD-EPI 2021: 79.2 ML/MIN/1.73
ERYTHROCYTE [DISTWIDTH] IN BLOOD BY AUTOMATED COUNT: 12.4 % (ref 12.3–15.4)
GLUCOSE SERPL-MCNC: 173 MG/DL (ref 65–99)
HCT VFR BLD AUTO: 41.7 % (ref 37.5–51)
HGB BLD-MCNC: 13.7 G/DL (ref 13–17.7)
MCH RBC QN AUTO: 27.2 PG (ref 26.6–33)
MCHC RBC AUTO-ENTMCNC: 32.9 G/DL (ref 31.5–35.7)
MCV RBC AUTO: 82.7 FL (ref 79–97)
PLATELET # BLD AUTO: 238 10*3/MM3 (ref 140–450)
PMV BLD AUTO: 11 FL (ref 6–12)
POTASSIUM SERPL-SCNC: 4 MMOL/L (ref 3.5–5.2)
RBC # BLD AUTO: 5.04 10*6/MM3 (ref 4.14–5.8)
SODIUM SERPL-SCNC: 138 MMOL/L (ref 136–145)
WBC NRBC COR # BLD AUTO: 14.77 10*3/MM3 (ref 3.4–10.8)

## 2024-12-23 PROCEDURE — 71045 X-RAY EXAM CHEST 1 VIEW: CPT

## 2024-12-23 PROCEDURE — 80048 BASIC METABOLIC PNL TOTAL CA: CPT | Performed by: NURSE PRACTITIONER

## 2024-12-23 PROCEDURE — 25010000002 FUROSEMIDE PER 20 MG: Performed by: NURSE PRACTITIONER

## 2024-12-23 PROCEDURE — 85027 COMPLETE CBC AUTOMATED: CPT | Performed by: NURSE PRACTITIONER

## 2024-12-23 RX ADMIN — FUROSEMIDE 20 MG: 10 INJECTION, SOLUTION INTRAMUSCULAR; INTRAVENOUS at 08:56

## 2024-12-23 RX ADMIN — Medication 10 ML: at 20:01

## 2024-12-23 RX ADMIN — POTASSIUM CHLORIDE 20 MEQ: 750 CAPSULE, EXTENDED RELEASE ORAL at 17:10

## 2024-12-23 RX ADMIN — IBUPROFEN 400 MG: 800 TABLET, FILM COATED ORAL at 19:46

## 2024-12-23 RX ADMIN — AMLODIPINE BESYLATE 5 MG: 5 TABLET ORAL at 08:56

## 2024-12-23 RX ADMIN — ACETAMINOPHEN 650 MG: 325 TABLET ORAL at 19:47

## 2024-12-23 RX ADMIN — Medication 10 ML: at 08:57

## 2024-12-23 RX ADMIN — LISINOPRIL 20 MG: 20 TABLET ORAL at 08:56

## 2024-12-23 RX ADMIN — POTASSIUM CHLORIDE 20 MEQ: 750 CAPSULE, EXTENDED RELEASE ORAL at 08:56

## 2024-12-23 NOTE — DISCHARGE SUMMARY
[facility] Cardiothoracic Surgery  DISCHARGE SUMMARY        Date of Admission: 12/16/2024  Date of Discharge:  12/24/2024  Primary Care Physician: Anupam Thorne MD    Discharge Diagnoses:  Active Hospital Problems    Diagnosis     **Recurrent right pleural effusion     Pleural effusion        Procedures Performed:   Pleural Drainage, percutaneous, with insertion of indwelling catheter; with image guidance by DR. Licona on 12/16/2024    HPI:  Mr. Issac Back is a 64 y.o. male who previously underwent right 10th rib excision for large mass.  He has had recurrent right pleural effusion on the right side and underwent outpatient thoracentesis with reaccumulation of fluid.  Patient became progressively short of breath and was advised admission for right pigtail catheter placement and talc pleurodesis.    Hospital Course: On 12/16/2024, Mr. Back was admitted and ultrasound-guided right pigtail catheter was placed at bedside by Dr. Licona.  Adequate time was allowed for pleural effusion to drain and outputs to decrease.  He underwent talc pleurodesis at the bedside on 12/22/2024 and tolerated this without remark.  The right pigtail catheter was removed on 12/24/2024 and he meets criteria for discharge home.  Dr. Licona discussed with patient if pleural fluid returns, would favor VATS exploration and possible placement of mesh.  He understands and agrees.      Follow up in 3-4 weeks with CT Chest    Condition on Discharge:  Neurologically intact and has good pain control.  He is eating well.      Discharge Disposition:  Home or Self Care [1]    Discharge Medications:     Discharge Medications        Continue These Medications        Instructions Start Date   acetaminophen 325 MG tablet  Commonly known as: TYLENOL   650 mg, Oral, Every 6 Hours PRN      amLODIPine-benazepril 5-20 MG per capsule  Commonly known as: LOTREL 5-20   1 capsule, Daily      ibuprofen 200 MG tablet  Commonly known as:  ADVIL,MOTRIN   400 mg, Oral, Every 8 Hours PRN             Stop These Medications      furosemide 20 MG tablet  Commonly known as: LASIX     potassium chloride 10 MEQ CR capsule  Commonly known as: MICRO-K              Discharge Diet: Regular diet     Discharge Care Plan / Instructions:  Chest tube sites with dressings to keep on for 48 hours.  Ok to reapply dressing as needed after removal.      Activity at Discharge:   No heavy lifting greater than 5 pounds or a gallon of milk and refrain from driving until cleared.      Tobacco: The patient does not use tobacco products and therefore does not need tobacco cessation education.    BMI:  Body mass index is 35.46 kg/m².    Follow-up Appointments: Issac Back  is requested to see Anupam Thorne MD within 1-2 weeks from time of discharge, to follow-up with Dr. Licona on 3-4 weeks with CT Chest

## 2024-12-23 NOTE — PLAN OF CARE
Goal Outcome Evaluation:              Outcome Evaluation: VSS. continues with no tele monitor. no c/o pain at this time. given tylenol r/t elevation in temp that has decreased. PO fluids encouraged to pt. ad claudia for transfers. atrium changed out this shift. call light within reach. safety maintained.

## 2024-12-23 NOTE — PROGRESS NOTES
"Patient name: Issac Back  Patient : 1960  VISIT # [id number]  MR #[id number]    Procedure:  Procedure Date:  POD:* No surgery found *    Subjective   Chief Complaint   Patient presents with    Shortness of Breath     On room air. Right pigtail catheter remains in place.  Underwent talc pleurodesis at bedside yesterday and right chest tube output has significantly decreased.    ROS: No fevers or chills.  Shortness of breath improved.  No chest pain.       Objective     Visit Vitals  /77 (BP Location: Left arm, Patient Position: Sitting)   Pulse 81   Temp 97.2 °F (36.2 °C) (Oral)   Resp 16   Ht 180.3 cm (71\")   Wt 115 kg (254 lb 4 oz)   SpO2 97%   BMI 35.46 kg/m²       Intake/Output Summary (Last 24 hours) at 2024 1004  Last data filed at 2024 0740  Gross per 24 hour   Intake 520 ml   Output 1737 ml   Net -1217 ml     Right pigtail: 467 mL in 24 hours, serous, no airleak    Lab:     CBC:  Results from last 7 days   Lab Units 24  0245 24  0357 24  0158   WBC 10*3/mm3 14.77* 10.40 10.57   HEMATOCRIT % 41.7 42.2 43.7   PLATELETS 10*3/mm3 238 239 242          BMP:  Results from last 7 days   Lab Units 24  0245 24  0313 24  0357   SODIUM mmol/L 138 141 138   POTASSIUM mmol/L 4.0 3.6 3.8   CHLORIDE mmol/L 103 102 100   CO2 mmol/L 25.0 29.0 27.0   GLUCOSE mg/dL 173* 122* 123*   BUN mg/dL 20 17 16   CREATININE mg/dL 1.04 0.98 0.84          COAG:      Invalid input(s): \"PT\"    IMAGES:       Imaging Results (Last 24 Hours)       Procedure Component Value Units Date/Time    XR Chest 1 View [[id number]] Collected: 24     Updated: 24    Narrative:      EXAMINATION: XR CHEST 1 VW-     2024 2:50 AM     HISTORY: right pleural effusion; R06.00-Dyspnea, unspecified;  N79-Asqezkc effusion, not elsewhere classified     A frontal lordotic view of the chest is compared with the previous study  dated 2024.     There is right lower lobar " consolidation which appear moderately more  progressive since the previous study.     There is probable trace right basal pleural effusion.     Previously seen right lower chest pleural catheter is not visualized in  this study.     There is no pulmonary congestion or pneumothorax.     There is moderate cardiomegaly.     There is deformity of the multiple ribs bilaterally which is probably  due to the projection. A coincidental fracture may not be excluded.       Impression:      1. Right lower lobar consolidation appears more progressive since the  previous study. This may partly be projectional. There is a probable  trace right basal pleural effusion. Right lower chest catheter is not  visualized in this study.  2. Moderate cardiomegaly.        This report was signed and finalized on 12/23/2024 7:09 AM by Dr. Armida Anders MD.       CT Chest With Contrast Diagnostic [[id number]] Collected: 12/22/24 1135     Updated: 12/22/24 1147    Narrative:      EXAMINATION: CT CHEST W CONTRAST DIAGNOSTIC- 12/22/2024 11:35 AM     HISTORY: Persistent pleural effusion, postop; R06.00-Dyspnea,  unspecified; V09-Zaffylh effusion, not elsewhere classified     TOTAL DOSE: 282.11 mGy.cm (Automatic exposure control technique was  implemented in an effort to keep the radiation dose as low as possible  without compromising image quality)     REPORT: Spiral CT of the chest was performed after administration of  intravenous contrast from the thoracic inlet through the upper abdomen.  Reconstructed coronal and sagittal images were also reviewed.     Comparison: CT chest without contrast 12/17/2024. Review of lung windows  demonstrates a small curvilinear focus of suspected subpleural scarring  in the anterior right lung apex image 26. There are no focal pulmonary  infiltrates. Interval decrease in size of small right pleural effusion,  some of the fluid is now tracking into the major fissure. There is a  fluid collection in the right  posterior chest wall which has decreased  in size, possibly a postoperative hematoma. This currently measures  maximum 2.5 x 6.7 cm, compared with 3.2 x 8.2 cm before. There has been  partial resection of the right 10th rib. A small bore right basilar  chest tube is present and appears to be in satisfactory position. No  pneumothorax is identified. Soft tissue windows show no evidence of  intrathoracic lymphadenopathy. The thyroid gland appears homogeneous and  normal. There are calcified mediastinal and hilar lymph nodes compatible  with healed granulomatous disease. Heart size is normal. Caliber of the  pulmonary arteries is normal, mild ectasia of the ascending aorta  measuring 3.8 cm. No aortic aneurysm or evidence of aortic dissection.  Cholecystectomy clips are present.       Impression:      1. The small bore right basilar pigtail pleural catheter remains in good  position, there is trace residual posterior dependent pleural effusion  and a small volume of fluid tracks into the major fissure. No evidence  of empyema.  2. Interval decrease in size of the fluid collection in the subcutaneous  soft tissues overlying the surgical site probably a postoperative  hematoma.               This report was signed and finalized on 12/22/2024 11:44 AM by Dr. Chandra Fitzgerald MD.             CXR: Difficult to visualize right pigtail catheter.  Bibasilar atelectasis.  Small right pleural effusion.    Physical Exam:  General: Alert, oriented. No apparent distress.  Obese  Cardiovascular: Regular rate and rhythm without murmur, rubs, or gallops.    Pulmonary: Clear to auscultation bilaterally without wheezing, rubs, or rales.  Chest: Right chest tube to 20 cm suction. No air leak. Fluid is serous  Abdomen: Soft, nondistended, and nontender.  Extremities: Warm, moves all extremities. No edema.   Neurologic:  Grossly intact with no focal deficits.            Impression:  Recurrent right pleural effusion  Shortness of  breath  Obesity BMI 35      Plan:  Continue right pigtail catheter to 20 cm suction  Nursing to flush right pigtail catheter with 10 mL normal saline every 8 hours to maintain tube patency  Daily chest x-ray  Avoid Tegaderm dressings  Incentive spirometer  Discussed with patient and nursing      Fifi Brown, ROLAND  12/23/24  10:04 CST

## 2024-12-23 NOTE — PLAN OF CARE
Goal Outcome Evaluation:  Plan of Care Reviewed With: patient        Progress: no change  Outcome Evaluation: No co pain. Chest to to right chest with little output. Flushed with normal saline. Cont to monitor.

## 2024-12-23 NOTE — CONSULTS
Nutrition Services    Patient Name:  Issac Back  YOB: 1960  MRN: [id number]  Admit Date:  12/16/2024    Nutrition screening and chart review completed. Pt remains at low risk/no risk for malnutrition. Continue with daily menu selections and observe food preferences. Will monitor while inpatient and follow per protocol.      Electronically signed by:  Isadoar Awan RDN, LD  12/23/24 09:28 CST

## 2024-12-24 ENCOUNTER — READMISSION MANAGEMENT (OUTPATIENT)
Dept: CALL CENTER | Facility: HOSPITAL | Age: 64
End: 2024-12-24
Payer: MEDICARE

## 2024-12-24 ENCOUNTER — APPOINTMENT (OUTPATIENT)
Dept: GENERAL RADIOLOGY | Facility: HOSPITAL | Age: 64
End: 2024-12-24
Payer: MEDICARE

## 2024-12-24 VITALS
BODY MASS INDEX: 35.6 KG/M2 | OXYGEN SATURATION: 99 % | RESPIRATION RATE: 16 BRPM | DIASTOLIC BLOOD PRESSURE: 61 MMHG | SYSTOLIC BLOOD PRESSURE: 114 MMHG | HEART RATE: 86 BPM | HEIGHT: 71 IN | TEMPERATURE: 98.5 F | WEIGHT: 254.25 LBS

## 2024-12-24 PROCEDURE — 71046 X-RAY EXAM CHEST 2 VIEWS: CPT

## 2024-12-24 PROCEDURE — 71045 X-RAY EXAM CHEST 1 VIEW: CPT

## 2024-12-24 RX ADMIN — LISINOPRIL 20 MG: 20 TABLET ORAL at 08:42

## 2024-12-24 RX ADMIN — AMLODIPINE BESYLATE 5 MG: 5 TABLET ORAL at 08:42

## 2024-12-24 RX ADMIN — ACETAMINOPHEN 650 MG: 325 TABLET ORAL at 12:17

## 2024-12-24 RX ADMIN — Medication 10 ML: at 08:42

## 2024-12-24 RX ADMIN — POTASSIUM CHLORIDE 20 MEQ: 750 CAPSULE, EXTENDED RELEASE ORAL at 08:42

## 2024-12-24 NOTE — PLAN OF CARE
Goal Outcome Evaluation:  Plan of Care Reviewed With: patient        Progress: improving  Outcome Evaluation: VSS.  No tele orders.  Pt had slight fever at the beginning of the shift and c/o achy pain in his joints.  Tylenol and Ibuprofen given with relief.  Scant drainage from chest tube, flushed per order.  Safety maintained.

## 2024-12-24 NOTE — PROGRESS NOTES
"    [facility] Cardiothoracic Surgery  PROGRESS NOTE   CC: Right pleural effusion    Subjective:  Doing well today, pain well-controlled a little bit of a cough but discussed with him this is expected after talc    ROS: Low-grade temp yesterday likely related to talc pleurodesis    Objective:      /55 (BP Location: Left arm, Patient Position: Sitting)   Pulse 80   Temp 97.6 °F (36.4 °C) (Oral)   Resp 16   Ht 180.3 cm (71\")   Wt 115 kg (254 lb 4 oz)   SpO2 99%   BMI 35.46 kg/m²       Intake/Output Summary (Last 24 hours) at 12/24/2024 1133  Last data filed at 12/24/2024 0525  Gross per 24 hour   Intake 660 ml   Output 1465 ml   Net -805 ml       CT Output: 15 cc out    PE:  Vitals:    12/24/24 0706   BP: 118/55   Pulse: 80   Resp: 16   Temp: 97.6 °F (36.4 °C)   SpO2: 99%     GENERAL: NAD, resting comfortably, normal color  CARDIOVASCULAR: regular, regular rate, sinus  PULMONARY: Normal bilateral breath sounds, no labored breathing  ABDOMEN: soft, nontender/nondistended  EXTREMITIES: mild peripheral edema, normal pulses, normal ROM        Lab Results (last 72 hours)       Procedure Component Value Units Date/Time    Basic Metabolic Panel [[id number]]  (Abnormal) Collected: 12/23/24 0245    Specimen: Blood Updated: 12/23/24 0324     Glucose 173 mg/dL      BUN 20 mg/dL      Creatinine 1.04 mg/dL      Sodium 138 mmol/L      Potassium 4.0 mmol/L      Chloride 103 mmol/L      CO2 25.0 mmol/L      Calcium 8.4 mg/dL      BUN/Creatinine Ratio 19.2     Anion Gap 10.0 mmol/L      eGFR 79.2 mL/min/1.73     Narrative:      GFR Categories in Chronic Kidney Disease (CKD)      GFR Category          GFR (mL/min/1.73)    Interpretation  G1                     90 or greater         Normal or high (1)  G2                      60-89                Mild decrease (1)  G3a                   45-59                Mild to moderate decrease  G3b                   30-44                Moderate to severe decrease  G4 "                    15-29                Severe decrease  G5                    14 or less           Kidney failure          (1)In the absence of evidence of kidney disease, neither GFR category G1 or G2 fulfill the criteria for CKD.    eGFR calculation 2021 CKD-EPI creatinine equation, which does not include race as a factor    CBC (No Diff) [[id number]]  (Abnormal) Collected: 12/23/24 0245    Specimen: Blood Updated: 12/23/24 0303     WBC 14.77 10*3/mm3      RBC 5.04 10*6/mm3      Hemoglobin 13.7 g/dL      Hematocrit 41.7 %      MCV 82.7 fL      MCH 27.2 pg      MCHC 32.9 g/dL      RDW 12.4 %      RDW-SD 37.4 fl      MPV 11.0 fL      Platelets 238 10*3/mm3     Blood Culture - Blood, Arm, Left [[id number]]  (Normal) Collected: 12/16/24 1131    Specimen: Blood from Arm, Left Updated: 12/21/24 1200     Blood Culture No growth at 5 days    Blood Culture - Blood, Arm, Right [[id number]]  (Normal) Collected: 12/16/24 1130    Specimen: Blood from Arm, Right Updated: 12/21/24 1200     Blood Culture No growth at 5 days                CXR: Inflammatory changes consistent with talc pleurodesis, no significant pleural effusion    Assessment & Plan     64-year-old male with previous right rib resection, has a chest wall muscular defect at surgical site but a recurrent right pleural effusion.  Underwent talc pleurodesis 2 days ago.  Minimal draining from chest tube.    Chest tube removed without difficulty  Repeat chest x-ray in 2 hours if okay then okay for discharge home  Follow-up in 3 to 4 weeks with repeat CT chest    Mariano Licona MD  Cardiothoracic Surgeon

## 2024-12-25 ENCOUNTER — TELEPHONE (OUTPATIENT)
Dept: CARDIAC SURGERY | Facility: CLINIC | Age: 64
End: 2024-12-25
Payer: MEDICARE

## 2024-12-25 NOTE — TELEPHONE ENCOUNTER
Patient was discharged and needs 3-4 week follow up with Dr. Licona with repeat CT Chest.  Please call patient to schedule

## 2024-12-25 NOTE — OUTREACH NOTE
Prep Survey      Flowsheet Row Responses   [facility] facility patient discharged from? [place]   Is LACE score < 7 ? No   Eligibility Readm Mgmt   Discharge diagnosis shortness of breath   Does the patient have one of the following disease processes/diagnoses(primary or secondary)? Other   Does the patient have Home health ordered? No   Is there a DME ordered? No   Prep survey completed? Yes            SYLVIA HENRY - Registered Nurse

## 2024-12-26 ENCOUNTER — TELEPHONE (OUTPATIENT)
Dept: CARDIAC SURGERY | Facility: CLINIC | Age: 64
End: 2024-12-26
Payer: MEDICARE

## 2024-12-26 NOTE — TELEPHONE ENCOUNTER
"Pt calling c/o SOA, cough and states his \"lungs feel like they are rubbing on sandpaper\" when he breathes.  Pt does sound SOA during this call and did cough multiple times.  Can reach him at #[phone]/blu  "

## 2024-12-26 NOTE — TELEPHONE ENCOUNTER
Appt sched.  Detailed vm message left for pt with appt info and stating office would call him with appt time for CT to go with this appt.  Office appt notice mailed to pt as well/tian

## 2024-12-26 NOTE — TELEPHONE ENCOUNTER
Called patient for further information.  He states symptoms are improved since he went in to the hospital and are not worsening since discharge 2 days ago.  He does have some shortness of breath and feels pain on the right side when he takes a deep breath.  Discussed with Dr. Licona as well, we discussed that this can be normal s/p talc pleurodesis and that he may have some cough associated with this.  He verbalized understanding.  Advised him to continue to monitor and if symptoms are worsening or shortness of breath is worsening he has to let me know.  He verbalizes understanding and is agreeable.

## 2024-12-30 ENCOUNTER — TELEPHONE (OUTPATIENT)
Dept: CARDIAC SURGERY | Facility: CLINIC | Age: 64
End: 2024-12-30
Payer: MEDICARE

## 2024-12-30 NOTE — TELEPHONE ENCOUNTER
Called pt re: upcoming appointments. No answer. LM.  If pt returns call, please provide appointment information, including instructions, and document in this encounter.

## 2025-01-02 ENCOUNTER — READMISSION MANAGEMENT (OUTPATIENT)
Dept: CALL CENTER | Facility: HOSPITAL | Age: 65
End: 2025-01-02
Payer: MEDICARE

## 2025-01-02 NOTE — OUTREACH NOTE
Medical Week 1 Survey      Flowsheet Row Responses   [facility] patient discharged from? [place]   Does the patient have one of the following disease processes/diagnoses(primary or secondary)? Other   Week 1 attempt successful? Yes   Call start time 1544   Call end time 1556   List who call center can speak with pt   Meds reviewed with patient/caregiver? Yes   Is the patient having any side effects they believe may be caused by any medication additions or changes? No   Is the patient taking all medications as directed (includes completed medication regime)? Yes   Comments regarding appointments Pt to see pcp on 1-.   Does the patient have a primary care provider?  Yes   Has the patient kept scheduled appointments due by today? N/A   Has home health visited the patient within 72 hours of discharge? N/A   Psychosocial issues? No   Did the patient receive a copy of their discharge instructions? Yes   Nursing interventions Reviewed instructions with patient   What is the patient's perception of their health status since discharge? Same   Is the patient/caregiver able to teach back signs and symptoms related to disease process for when to call PCP? Yes   Is the patient/caregiver able to teach back signs and symptoms related to disease process for when to call 911? Yes   Is the patient/caregiver able to teach back the hierarchy of who to call/visit for symptoms/problems? PCP, Specialist, Home health nurse, Urgent Care, ED, 911 Yes   Week 1 call completed? Yes   Is the patient interested in additional calls from an ambulatory ? No   Wrap up additional comments Pt reports feeling the same. Pt is not sob but having difficulty deep breathing. Pt has all medications. Pt has all appointments scheduled. Pt understands to return to ED if s/s worsen.   Call end time 1556            Fifi MARTINEZ - Registered Nurse

## 2025-01-14 ENCOUNTER — HOSPITAL ENCOUNTER (OUTPATIENT)
Dept: CT IMAGING | Facility: HOSPITAL | Age: 65
Discharge: HOME | End: 2025-01-14
Admitting: NURSE PRACTITIONER
Payer: MEDICARE

## 2025-01-14 ENCOUNTER — READMISSION MANAGEMENT (OUTPATIENT)
Dept: CALL CENTER | Facility: HOSPITAL | Age: 65
End: 2025-01-14
Payer: MEDICARE

## 2025-01-14 DIAGNOSIS — J90 RECURRENT RIGHT PLEURAL EFFUSION: ICD-10-CM

## 2025-01-14 PROCEDURE — 25510000001 IOPAMIDOL 61 % SOLUTION: Performed by: NURSE PRACTITIONER

## 2025-01-14 PROCEDURE — 71260 CT THORAX DX C+: CPT

## 2025-01-14 RX ORDER — IOPAMIDOL 612 MG/ML
100 INJECTION, SOLUTION INTRAVASCULAR
Status: COMPLETED | OUTPATIENT
Start: 2025-01-14 | End: 2025-01-14

## 2025-01-14 RX ADMIN — IOPAMIDOL 75 ML: 612 INJECTION, SOLUTION INTRAVENOUS at 08:10

## 2025-01-14 NOTE — OUTREACH NOTE
Medical Week 2 Survey      Flowsheet Row Responses   [facility] patient discharged from? [place]   Does the patient have one of the following disease processes/diagnoses(primary or secondary)? Other   Week 2 attempt successful? No   Unsuccessful attempts Attempt 1            Chasity Boyle Registered Nurse

## 2025-01-16 ENCOUNTER — OFFICE VISIT (OUTPATIENT)
Dept: CARDIAC SURGERY | Facility: CLINIC | Age: 65
End: 2025-01-16
Payer: MEDICARE

## 2025-01-16 VITALS
WEIGHT: 256.8 LBS | DIASTOLIC BLOOD PRESSURE: 77 MMHG | HEIGHT: 71 IN | OXYGEN SATURATION: 96 % | BODY MASS INDEX: 35.95 KG/M2 | SYSTOLIC BLOOD PRESSURE: 118 MMHG | HEART RATE: 74 BPM

## 2025-01-16 DIAGNOSIS — J90 RECURRENT RIGHT PLEURAL EFFUSION: Primary | ICD-10-CM

## 2025-01-16 DIAGNOSIS — J90 PLEURAL EFFUSION ON RIGHT: Primary | ICD-10-CM

## 2025-01-16 DIAGNOSIS — M89.9 RIB LESION: ICD-10-CM

## 2025-01-16 PROCEDURE — 1159F MED LIST DOCD IN RCRD: CPT | Performed by: SURGERY

## 2025-01-16 PROCEDURE — 1160F RVW MEDS BY RX/DR IN RCRD: CPT | Performed by: SURGERY

## 2025-01-16 PROCEDURE — 99214 OFFICE O/P EST MOD 30 MIN: CPT | Performed by: SURGERY

## 2025-01-16 NOTE — PROGRESS NOTES
[facility] Cardiothoracic Surgery  PROGRESS NOTE   CC: S/P Right Rib Resection, Recurrent Right Pleural Effusion    Subjective:   History of Present Illness  The patient presents for evaluation of a cough.    He reports an improvement in his condition compared to the week following [place]. He experiences a sensation of his right lung making contact with an unknown structure during deep inhalation, which he speculates could be his diaphragm. He has not been experiencing any coughing episodes unless he assumes a bent-over position. He recently had a consultation with Dr. Jewell for a physical examination, during which he was informed that his cough would gradually subside over time. He was also advised that the healing process post-procedure could extend up to 2 months.      ROS: Cardiovascular ROS: negative      Objective:    PE:  Vitals:    01/16/25 1116   BP: 118/77   Pulse: 74   SpO2: 96%     GENERAL: NAD, resting comfortably, normal palor  CARDIOVASCULAR: regular, regular rate, sinus  PULMONARY: Normal bilateral breath sounds, no labored breathing moderate protrusion at right lower chest incision site  ABDOMEN: soft, nt/nd  EXTREMITIES: mild peripheral edema, normal pulses, normal ROM      Physical Exam        Lab Results (last 72 hours)       ** No results found for the last 72 hours. **              Results  I personally reviewed CT scan chest the following is my interpretation:  Pleural fluid is now minimal, there is a minor amount of fluid in the major fissure, there is a defect in the chest wall/lateral abdominal wall that is stable and unchanged.      Assessment & Plan     Assessment & Plan    Mr. Back is a 64-year-old male he is now 3-1/2 months out from right 10th rib excision for large rib mass.  Postoperatively he did okay but had dehiscence of the muscular layer and subsequent moderate size right pleural effusion.  Underwent thoracentesis once but had recurrence so we  admitted him to the hospital and perform talc pleurodesis on that side and he returns today with follow-up with CT scan.  I have reviewed the imaging and it shows there is minimal to any pleural fluid on the right.  His cough is much better his breathing is better he sometimes feels a catch in the area of surgery with a deep breath.  He does have a hernia defect in the muscular layer of his right chest wall/lateral abdominal wall.  We need to monitor this and if it becomes larger we will have to discuss closure.  Overall I am happy with how he is done from a recurrent pleural effusion standpoint.    Thank you for trusting me with the care of Mr. Back.  We will see him back in 6 months with a repeat CT scan to continue to survey the lateral hernia.  Please do not hesitate to call questions or concerns.        Mariano Licona MD   Cardiothoracic Surgeon    Patient or patient representative verbalized consent for the use of Ambient Listening during the visit with  Mariano Licona MD for chart documentation. 1/16/2025  13:00 CST

## 2025-01-16 NOTE — LETTER
January 16, 2025     Anupam Thorne MD  [address]  [place] [address]    Patient: Issac Back   YOB: 1960   Date of Visit: 1/16/2025       Dear Anupam Thorne MD,    Issac Back was in my office today. Below are the relevant portions of my assessment and plan of care.    Mr. Back is a 64-year-old male he is now 3-1/2 months out from right 10th rib excision for large rib mass.  Postoperatively he did okay but had dehiscence of the muscular layer and subsequent moderate size right pleural effusion.  Underwent thoracentesis once but had recurrence so we admitted him to the hospital and perform talc pleurodesis on that side and he returns today with follow-up with CT scan.  I have reviewed the imaging and it shows there is minimal to any pleural fluid on the right.  His cough is much better his breathing is better he sometimes feels a catch in the area of surgery with a deep breath.  He does have a hernia defect in the muscular layer of his right chest wall/lateral abdominal wall.  We need to monitor this and if it becomes larger we will have to discuss closure.  Overall I am happy with how he is done from a recurrent pleural effusion standpoint.    Thank you for trusting me with the care of Mr. Back.  We will see him back in 6 months with a repeat CT scan to continue to survey the lateral hernia.  Please do not hesitate to call questions or concerns.         Sincerely,        Mariano Licona MD        CC: No Recipients

## 2025-01-24 ENCOUNTER — TELEPHONE (OUTPATIENT)
Dept: UROLOGY | Facility: CLINIC | Age: 65
End: 2025-01-24
Payer: MEDICARE

## 2025-01-24 NOTE — TELEPHONE ENCOUNTER
Called patient to remind him to have KUB done an hour prior to appointment, patient stated that he will come for the appointment but he has had enough xrays and will not be getting the KUB prior to the appointment, I explained it is to be able to see the kidney stones and the size but he repeated that he will come for the appointment, he will bring us the stone but he has had enough xrays. I verbalized understanding and let patient know that I would let the provider know about this.

## 2025-03-27 NOTE — PROGRESS NOTES
Subjective    Mr. Back is 64 y.o. male    Chief Complaint: Right Ureteral Stone    History of Present Illness  With previous history of kidney stones he had a 3 mm known right ureteral stone he was never aware he passed when I saw him in August 2024.  He had an ultrasound that showed no hydronephrosis or abnormalities he states that he was asymptomatic and January developed symptoms of a stone and ended up passing a 3 mm stone most likely the original stone seen.  Since then has had no further episodes of discomfort or pain no gross hematuria he is urinating fine.  He had a KUB prior to his appointment today that shows no obvious calcifications.      The following portions of the patient's history were reviewed and updated as appropriate: allergies, current medications, past family history, past medical history, past social history, past surgical history and problem list.    Review of Systems   Genitourinary:  Negative for difficulty urinating.         Current Outpatient Medications:     acetaminophen (TYLENOL) 325 MG tablet, Take 2 tablets by mouth Every 6 (Six) Hours As Needed for Mild Pain., Disp: , Rfl:     amLODIPine-benazepril (LOTREL 5-20) 5-20 MG per capsule, Take 1 capsule by mouth Daily., Disp: , Rfl:     ibuprofen (ADVIL,MOTRIN) 200 MG tablet, Take 2 tablets by mouth Every 8 (Eight) Hours As Needed for Mild Pain., Disp: , Rfl:     Past Medical History:   Diagnosis Date    Bone cyst     RIGHT RIB    History of colon polyps     Hypertension     Kidney stone     Rheumatic heart disease        Past Surgical History:   Procedure Laterality Date    CHOLECYSTECTOMY  10/02/2022    COLONOSCOPY  10/12/2015    One 6mm hyperplastic polyp in the rectum; Repeat 5 years    COLONOSCOPY N/A 01/27/2021    Procedure: COLONOSCOPY WITH ANESTHESIA;  Surgeon: Melinda Burns MD;  Location: [facility] ENDOSCOPY;  Service: Gastroenterology;  Laterality: N/A;  pre op: hx polyps  post op: int hemmhroids   PCP: Anupam Quintana MD     "THORACOTOMY Right 2024    Procedure: RIGHT 10th RIB EXCISION;  Surgeon: Mariano Licona MD;  Location: [facility] OR;  Service: Cardiothoracic;  Laterality: Right;    VASECTOMY         Social History     Socioeconomic History    Marital status:    Tobacco Use    Smoking status: Former     Current packs/day: 0.00     Average packs/day: 1 pack/day for 13.0 years (13.0 ttl pk-yrs)     Types: Cigarettes     Start date:      Quit date:      Years since quittin.2     Passive exposure: Past    Smokeless tobacco: Never   Vaping Use    Vaping status: Never Used   Substance and Sexual Activity    Alcohol use: Not Currently    Drug use: Never    Sexual activity: Not Currently     Birth control/protection: Vasectomy       Family History   Problem Relation Age of Onset    Brain cancer Father     No Known Problems Mother     Colon cancer Neg Hx     Colon polyps Neg Hx     Esophageal cancer Neg Hx     Liver cancer Neg Hx     Liver disease Neg Hx     Rectal cancer Neg Hx     Stomach cancer Neg Hx        Objective    Temp 97.4 °F (36.3 °C)   Ht 182.9 cm (72\")   Wt 120 kg (264 lb 6.4 oz)   BMI 35.86 kg/m²     Physical Exam  Constitutional:       Appearance: Normal appearance.   HENT:      Head: Normocephalic and atraumatic.   Pulmonary:      Effort: Pulmonary effort is normal.   Skin:     Coloration: Skin is not pale.   Neurological:      Mental Status: He is alert.   Psychiatric:         Mood and Affect: Mood normal.         Behavior: Behavior normal.         KUB independent review    A KUB is available for me to review today.  The image is inspected for a bowel gas pattern and the general bone structure of the spine and pelvis. The kidneys are then inspected closely.  Renal outline is noted if identifiable. The kidney, collecting system, and anticipated path of the ureter are examined for calcifications including those in the true pelvis.  This film reveals:    On the right there are no calcificaitons seen " in the kidney or the expected course of the ureter. .    On the left there are no calcificaitons seen in the kidney or the expected course of the ureter. .    Assessment and Plan    Diagnoses and all orders for this visit:    1. Right ureteral stone (Primary)  -     Cancel: POC Urinalysis Dipstick, Multipro  -     Tissue Pathology Exam  -     XR Abdomen KUB; Future      Stone that we thought he had passed Loiselle in August he ended up passing this past January.  Since then he said no further episodes of pain or gross hematuria.  There is no obvious stone seen on the KUB today.    He will return in 1 year KUB prior.

## 2025-04-08 ENCOUNTER — TELEPHONE (OUTPATIENT)
Dept: UROLOGY | Facility: CLINIC | Age: 65
End: 2025-04-08
Payer: MEDICARE

## 2025-04-09 ENCOUNTER — HOSPITAL ENCOUNTER (OUTPATIENT)
Dept: GENERAL RADIOLOGY | Facility: HOSPITAL | Age: 65
Discharge: HOME | End: 2025-04-09
Admitting: PHYSICIAN ASSISTANT
Payer: MEDICARE

## 2025-04-09 ENCOUNTER — OFFICE VISIT (OUTPATIENT)
Dept: UROLOGY | Facility: CLINIC | Age: 65
End: 2025-04-09
Payer: MEDICARE

## 2025-04-09 VITALS — BODY MASS INDEX: 35.81 KG/M2 | TEMPERATURE: 97.4 F | HEIGHT: 72 IN | WEIGHT: 264.4 LBS

## 2025-04-09 DIAGNOSIS — N20.1 RIGHT URETERAL STONE: ICD-10-CM

## 2025-04-09 DIAGNOSIS — N20.1 RIGHT URETERAL STONE: Primary | ICD-10-CM

## 2025-04-09 PROCEDURE — 88300 SURGICAL PATH GROSS: CPT | Performed by: PHYSICIAN ASSISTANT

## 2025-04-09 PROCEDURE — 74018 RADEX ABDOMEN 1 VIEW: CPT

## 2025-04-10 LAB
LAB AP CASE REPORT: NORMAL
Lab: NORMAL
PATH REPORT.FINAL DX SPEC: NORMAL
PATH REPORT.GROSS SPEC: NORMAL

## 2025-07-17 ENCOUNTER — HOSPITAL ENCOUNTER (OUTPATIENT)
Dept: CT IMAGING | Facility: HOSPITAL | Age: 65
Discharge: HOME | End: 2025-07-17
Admitting: NURSE PRACTITIONER
Payer: MEDICARE

## 2025-07-17 DIAGNOSIS — J90 PLEURAL EFFUSION ON RIGHT: ICD-10-CM

## 2025-07-17 PROCEDURE — 71250 CT THORAX DX C-: CPT

## 2025-07-21 ENCOUNTER — OFFICE VISIT (OUTPATIENT)
Dept: CARDIAC SURGERY | Facility: CLINIC | Age: 65
End: 2025-07-21
Payer: MEDICARE

## 2025-07-21 VITALS
HEIGHT: 72 IN | OXYGEN SATURATION: 96 % | SYSTOLIC BLOOD PRESSURE: 142 MMHG | WEIGHT: 260 LBS | HEART RATE: 75 BPM | BODY MASS INDEX: 35.21 KG/M2 | DIASTOLIC BLOOD PRESSURE: 82 MMHG

## 2025-07-21 DIAGNOSIS — R22.2 CHEST WALL MASS: Primary | ICD-10-CM

## 2025-07-21 PROCEDURE — 99214 OFFICE O/P EST MOD 30 MIN: CPT | Performed by: SURGERY

## 2025-07-21 PROCEDURE — 1160F RVW MEDS BY RX/DR IN RCRD: CPT | Performed by: SURGERY

## 2025-07-21 PROCEDURE — 1159F MED LIST DOCD IN RCRD: CPT | Performed by: SURGERY

## 2025-07-21 NOTE — LETTER
July 24, 2025     Anupam Thorne MD  [address]  [place] [address]    Patient: Issac Back   YOB: 1960   Date of Visit: 7/21/2025       Dear Anupam Thorne MD,    Issac Back was in my office today. Below are the relevant portions of my assessment and plan of care.    Mr. Back is a 65-year-old male he is now 9 months postop from right 10th rib excision for a large rib mass.  He unfortunately after surgery had some dehiscence of his surgical muscular closure with a moderate size right pleural effusion.  Eventually had to have top pleurodesis on the right.  With this he is doing much better.  He has had no recurrence of the bulging in his right surgical site.  His incision is healed well.  I reviewed his CT scan performed now and there is no residual pleural effusion and overall happy with the surgical result.    Overall happy with how Mr. Back is done.  He can follow-up with me on an as-needed basis.  His pathology was benign.  Thank you for trusting me with the care of Mr. Back.  Please do not hesitate to call with questions or concerns.         Sincerely,        Mariano Licona MD        CC: No Recipients

## 2025-07-25 NOTE — PROGRESS NOTES
"    [facility] Cardiothoracic Surgery  PROGRESS NOTE   CC: s/p Right chest wall mass resection    Subjective:   History of Present Illness  The patient presents for evaluation of rib pain.    He reports an improvement in his condition, with no significant bulging observed. However, he occasionally experiences pain at the end of his rib. He also mentions a slight increase in his blood pressure.      ROS: Cardiovascular ROS: no chest pain or dyspnea on exertion      Objective:      /82   Pulse 75   Ht 182.9 cm (72\")   Wt 118 kg (260 lb)   SpO2 96%   BMI 35.26 kg/m²     [unfilled]    PE:  Vitals:    07/21/25 1555   BP: 142/82   Pulse: 75   SpO2: 96%     Physical Exam        Lab Results (last 72 hours)       ** No results found for the last 72 hours. **              Results  I personally reviewed CT scan of the chest the following is my interpretation:  No residual pleural effusion, very small extrapleural fluid in the previous site of chest wall herniation.    Assessment & Plan     Assessment & Plan    Mr. Back is a 65-year-old male he is now 9 months postop from right 10th rib excision for a large rib mass.  He unfortunately after surgery had some dehiscence of his surgical muscular closure with a moderate size right pleural effusion.  Eventually had to have top pleurodesis on the right.  With this he is doing much better.  He has had no recurrence of the bulging in his right surgical site.  His incision is healed well.  I reviewed his CT scan performed now and there is no residual pleural effusion and overall happy with the surgical result.    Overall happy with how Mr. Back is done.  He can follow-up with me on an as-needed basis.  His pathology was benign.  Thank you for trusting me with the care of Mr. Back.  Please do not hesitate to call with questions or concerns.          Mariano Licona MD   Cardiothoracic Surgeon    Patient or patient representative verbalized consent for the " use of Ambient Listening during the visit with  Mariano Licona MD for chart documentation. 7/24/2025  20:55 CDT

## (undated) DEVICE — MASK,OXYGEN,MED CONC,ADLT,7' TUB, UC: Brand: PENDING

## (undated) DEVICE — SUT SILK 4/0 SUTUPAK TIES 24IN SA73H

## (undated) DEVICE — APPL CHLORAPREP HI/LITE 26ML ORNG

## (undated) DEVICE — SUT SILK 2/0 SUTUPAK TIES 24IN SA75H

## (undated) DEVICE — CVR HNDL LIGHT RIGID

## (undated) DEVICE — BG OR ZSUT SADDLE 20IN CLR STRL

## (undated) DEVICE — CONTAINER,SPECIMEN,OR STERILE,4OZ: Brand: MEDLINE

## (undated) DEVICE — CLTH CLENS READYCLEANSE PERI CARE PK/5

## (undated) DEVICE — OASIS DRAIN, SINGLE, INLINE & ATS COMPATIBLE: Brand: OASIS

## (undated) DEVICE — DRAPE,UTILITY,TAPE,15X26,STERILE: Brand: MEDLINE

## (undated) DEVICE — SUT POLY BR TP 2STRND 1/8X30IN

## (undated) DEVICE — SENSR O2 OXIMAX FNGR A/ 18IN NONSTR

## (undated) DEVICE — SUT SILK 2/0 FS BLK 18IN 685G

## (undated) DEVICE — SUT GUT CHRM 3/0 SH 27IN G122H

## (undated) DEVICE — PAD,NON-ADHERENT,3X8,STERILE,LF,1/PK: Brand: MEDLINE

## (undated) DEVICE — SUT MNCRYL 4/0 PS2 27IN UD MCP426H

## (undated) DEVICE — Device: Brand: DEFENDO AIR/WATER/SUCTION AND BIOPSY VALVE

## (undated) DEVICE — CUFF,BP,DISP,1 TUBE,ADULT,HP: Brand: MEDLINE

## (undated) DEVICE — PAD MINOR UNIVERSAL: Brand: MEDLINE INDUSTRIES, INC.

## (undated) DEVICE — SUT VIC 0 CT1 CR8 27IN UD VCPP41D

## (undated) DEVICE — COVER,MAYO STAND,STERILE: Brand: MEDLINE

## (undated) DEVICE — ELECTRD BLD MEGADYNE EZCLEAN STD 2.75IN XLNG

## (undated) DEVICE — ELECTRODE,ECG,STRESS,FOAM,50PK: Brand: MEDLINE

## (undated) DEVICE — 3M™ IOBAN™ 2 ANTIMICROBIAL INCISE DRAPE 6650EZ: Brand: IOBAN™ 2

## (undated) DEVICE — SUT VIC 1 XLH 27IN VCP583G

## (undated) DEVICE — GLV SURG BIOGEL M LTX PF 7 1/2

## (undated) DEVICE — YANKAUER,BULB TIP WITH VENT: Brand: ARGYLE

## (undated) DEVICE — ELECTRD BLD EZ CLN MOD 6.5IN

## (undated) DEVICE — TRAP FLD MINIVAC MEGADYNE 100ML

## (undated) DEVICE — UTILITY MARKER W/MED LABELS: Brand: MEDLINE

## (undated) DEVICE — SUT PROLN 4/0 RB1 D/A 36IN 8557H

## (undated) DEVICE — ANTIBACTERIAL UNDYED BRAIDED (POLYGLACTIN 910), SYNTHETIC ABSORBABLE SUTURE: Brand: COATED VICRYL

## (undated) DEVICE — 24 FR STRAIGHT – SOFT PVC CATHETER: Brand: PVC THORACIC CATHETERS

## (undated) DEVICE — SUT SILK 3/0 SUTUPAK TIES 24IN SA74H

## (undated) DEVICE — TBG SMPL FLTR LINE NASL 02/C02 A/ BX/100

## (undated) DEVICE — SUT SILK 0 SUTUPAK TIES 24IN SA76G

## (undated) DEVICE — THE CHANNEL CLEANING BRUSH IS A NYLON FLEXI BRUSH ATTACHED TO A FLEXIBLE PLASTIC SHEATH DESIGNED TO SAFELY REMOVE DEBRIS FROM FLEXIBLE ENDOSCOPES.